# Patient Record
Sex: MALE | Race: WHITE | Employment: OTHER | RURAL
[De-identification: names, ages, dates, MRNs, and addresses within clinical notes are randomized per-mention and may not be internally consistent; named-entity substitution may affect disease eponyms.]

---

## 2017-04-24 ENCOUNTER — OFFICE VISIT (OUTPATIENT)
Dept: FAMILY MEDICINE CLINIC | Age: 72
End: 2017-04-24

## 2017-04-24 VITALS
OXYGEN SATURATION: 100 % | BODY MASS INDEX: 23.48 KG/M2 | SYSTOLIC BLOOD PRESSURE: 134 MMHG | TEMPERATURE: 97.5 F | WEIGHT: 164 LBS | RESPIRATION RATE: 18 BRPM | DIASTOLIC BLOOD PRESSURE: 73 MMHG | HEART RATE: 62 BPM | HEIGHT: 70 IN

## 2017-04-24 DIAGNOSIS — I10 ESSENTIAL HYPERTENSION: Primary | ICD-10-CM

## 2017-04-24 DIAGNOSIS — E78.00 HYPERCHOLESTEROLEMIA: ICD-10-CM

## 2017-04-24 DIAGNOSIS — N18.30 CKD (CHRONIC KIDNEY DISEASE) STAGE 3, GFR 30-59 ML/MIN (HCC): ICD-10-CM

## 2017-04-24 DIAGNOSIS — R79.89 ABNORMAL TSH: ICD-10-CM

## 2017-04-24 LAB
BILIRUB UR QL STRIP: NEGATIVE
GLUCOSE UR-MCNC: NEGATIVE MG/DL
KETONES P FAST UR STRIP-MCNC: NEGATIVE MG/DL
PH UR STRIP: 5.5 [PH] (ref 4.6–8)
PROT UR QL STRIP: NEGATIVE MG/DL
SP GR UR STRIP: 1.01 (ref 1–1.03)
UA UROBILINOGEN AMB POC: NORMAL (ref 0.2–1)
URINALYSIS CLARITY POC: CLEAR
URINALYSIS COLOR POC: YELLOW
URINE BLOOD POC: NEGATIVE
URINE LEUKOCYTES POC: NEGATIVE
URINE NITRITES POC: NEGATIVE

## 2017-04-24 RX ORDER — LISINOPRIL 40 MG/1
40 TABLET ORAL DAILY
Qty: 90 TAB | Refills: 1 | Status: CANCELLED | OUTPATIENT
Start: 2017-04-24

## 2017-04-24 NOTE — MR AVS SNAPSHOT
Visit Information Date & Time Provider Department Dept. Phone Encounter #  
 4/24/2017  3:00 PM Lalo Ge MD [facility] Primary Care [phone] [id number] Follow-up Instructions Return in about 3 months (around 7/24/2017). Upcoming Health Maintenance Date Due FOBT Q 1 YEAR AGE 50-75 2/16/1995 DTaP/Tdap/Td series (1 - Tdap) 5/1/2010 MEDICARE YEARLY EXAM 8/9/2017 GLAUCOMA SCREENING Q2Y 8/8/2018 Allergies as of 4/24/2017  Review Complete On: 4/24/2017 By: Lalo Ge MD  
  
 Severity Noted Reaction Type Reactions Pcn [Penicillins]  09/24/2013    Unknown (comments) Current Immunizations  Never Reviewed Name Date Influenza Vaccine 9/30/2016, 9/29/2014, 1/6/2013 Pneumococcal Conjugate (PCV-13) 1/2/2017 Pneumococcal Vaccine (Unspecified Type) 11/18/2010 Td 4/30/2010 Not reviewed this visit You Were Diagnosed With   
  
 Codes Comments Essential hypertension    -  Primary ICD-10-CM: I10 
ICD-9-CM: 401.9 CKD (chronic kidney disease) stage 3, GFR 30-59 ml/min     ICD-10-CM: N18.3 ICD-9-CM: 472. 3 Hypercholesterolemia     ICD-10-CM: E78.00 ICD-9-CM: 272.0 Abnormal TSH     ICD-10-CM: R79.89 ICD-9-CM: 790.6 Vitals BP Pulse Temp Resp Height(growth percentile) Weight(growth percentile) 134/73 (BP 1 Location: Left arm, BP Patient Position: Sitting) 62 97.5 °F (36.4 °C) (Oral) 18 5' 10\" (1.778 m) 164 lb (74.4 kg) SpO2 BMI Smoking Status 100% 23.53 kg/m2 Former Smoker Vitals History BMI and BSA Data Body Mass Index Body Surface Area  
 23.53 kg/m 2 1.92 m 2 Preferred Pharmacy Pharmacy Name Phone [facility], [address] AT [facility] [address] [phone] Your Updated Medication List  
  
   
This list is accurate as of: 4/24/17  3:50 PM.  Always use your most recent med list.  
  
  
  
 aspirin delayed-release 81 mg tablet Take 1 Tab by mouth daily. lisinopril 40 mg tablet Commonly known as:  Ace Pina Take 1 Tab by mouth daily. Indications: Hypertension  
  
 simvastatin 20 mg tablet Commonly known as:  ZOCOR Take 1 Tab by mouth nightly. We Performed the Following AMB POC URINALYSIS DIP STICK AUTO W/O MICRO [62494 CPT(R)] COLLECTION VENOUS BLOOD,VENIPUNCTURE [id number] CPT(R)] METABOLIC PANEL, COMPREHENSIVE [78458 CPT(R)] OCCULT BLOOD, IMMUNOASSAY (FIT) [id number] CPT(R)] TSH 3RD GENERATION [96198 CPT(R)] Follow-up Instructions Return in about 3 months (around 7/24/2017). Introducing [facility] & HEALTH SERVICES! Dear Rosalio Alcocer: Thank you for requesting a [employer] account. Our records indicate that you have previously registered for a [employer] account but its currently inactive. Please call our [employer] support line at [phone]. Additional Information If you have questions, please visit the Frequently Asked Questions section of the [employer] website at [email or web address] Remember, [employer] is NOT to be used for urgent needs. For medical emergencies, dial 911. Now available from your iPhone and Android! Please provide this summary of care documentation to your next provider. Your primary care clinician is listed as Jah Downs. If you have any questions after today's visit, please call [phone].

## 2017-04-24 NOTE — PROGRESS NOTES
HISTORY OF PRESENT ILLNESS  Calvin Russ III is a 67 y.o. male. Medication Refill   Pertinent negatives include no chest pain, no abdominal pain and no headaches. Hypertension  Blood pressures at home were increasing with the Lisinopril only. He tried going to 40mg of Lisinopril but still had elevated reading so for the past month he has been on Lisinoril 40mg with 1/4 of the Lisinopril 20/25 . Has been following a low sodium diet . Exercises daily and gets about 5974-8771 steps a day. Taking meds daily. Denies chest pain or shortness of breath. Still working in real estate. CKD  GFR in the 40s. He is on Lisinopril 40mg daily. We took him off HCTZ. Renal US normal in 2015. Avoids NSAIDs. He restarted the Lisinopril with HCTZ when his BP remained elevated and will take a 1/4 of a pill. Hyperlipidemia  Watching diet and taking Zocor. No myalgias. Exercising daily by walking. Borderline TSH  TSH was 5 in 2015 and again in Nov. Has not had any symptoms of hypothyroidism. Review of Systems   Constitutional: Negative for malaise/fatigue. HENT: Negative for congestion and sore throat. Respiratory: Negative for cough. Cardiovascular: Negative for chest pain and palpitations. Gastrointestinal: Negative for abdominal pain. Musculoskeletal: Negative for myalgias. Neurological: Negative for dizziness and headaches. Psychiatric/Behavioral: Negative for depression. Past Medical History:   Diagnosis Date    History of hepatitis A     Hypercholesterolemia     Hypertension     Polio     AGE 7    Renal insufficiency      Past Surgical History:   Procedure Laterality Date    HX CATARACT REMOVAL       Allergies   Allergen Reactions    Pcn [Penicillins] Unknown (comments)     Blood pressure 134/73, pulse 62, temperature 97.5 °F (36.4 °C), temperature source Oral, resp. rate 18, height 5' 10\" (1.778 m), weight 164 lb (74.4 kg), SpO2 100 %.     Physical Exam   Constitutional: He is oriented to person, place, and time. He appears well-developed and well-nourished. No distress. HENT:   Head: Normocephalic. Nose: Nose normal.   Mouth/Throat: Oropharynx is clear and moist.   Eyes: Conjunctivae are normal.   Neck: Neck supple. No carotid bruits   Cardiovascular: Normal rate, regular rhythm and normal heart sounds. No murmur heard. Pulmonary/Chest: Effort normal and breath sounds normal. No respiratory distress. Musculoskeletal: Normal range of motion. Lymphadenopathy:     He has no cervical adenopathy. Neurological: He is alert and oriented to person, place, and time. Skin: Skin is warm. Psychiatric: He has a normal mood and affect. Vitals reviewed. Results for orders placed or performed in visit on 18/68/28   METABOLIC PANEL, COMPREHENSIVE   Result Value Ref Range    Glucose 90 65 - 99 mg/dL    BUN 30 (H) 8 - 27 mg/dL    Creatinine 1.29 (H) 0.76 - 1.27 mg/dL    GFR est non-AA 55 (L) >59 mL/min/1.73    GFR est AA 64 >59 mL/min/1.73    BUN/Creatinine ratio 23 (H) 10 - 22    Sodium 141 136 - 144 mmol/L    Potassium 4.1 3.5 - 5.2 mmol/L    Chloride 100 97 - 106 mmol/L    CO2 29 18 - 29 mmol/L    Calcium 9.0 8.6 - 10.2 mg/dL    Protein, total 5.7 (L) 6.0 - 8.5 g/dL    Albumin 3.9 3.5 - 4.8 g/dL    GLOBULIN, TOTAL 1.8 1.5 - 4.5 g/dL    A-G Ratio 2.2 1.1 - 2.5    Bilirubin, total 0.5 0.0 - 1.2 mg/dL    Alk. phosphatase 47 39 - 117 IU/L    AST (SGOT) 19 0 - 40 IU/L    ALT (SGPT) 12 0 - 44 IU/L   CBC WITH AUTOMATED DIFF   Result Value Ref Range    WBC 5.8 3.4 - 10.8 x10E3/uL    RBC 4.48 4.14 - 5.80 x10E6/uL    HGB 13.3 12.6 - 17.7 g/dL    HCT 38.9 37.5 - 51.0 %    MCV 87 79 - 97 fL    MCH 29.7 26.6 - 33.0 pg    MCHC 34.2 31.5 - 35.7 g/dL    RDW 13.7 12.3 - 15.4 %    PLATELET 234 195 - 477 x10E3/uL    NEUTROPHILS 54 %    Lymphocytes 31 %    MONOCYTES 10 %    EOSINOPHILS 4 %    BASOPHILS 1 %    ABS.  NEUTROPHILS 3.1 1.4 - 7.0 x10E3/uL    Abs Lymphocytes 1.8 0.7 - 3.1 x10E3/uL ABS. MONOCYTES 0.6 0.1 - 0.9 x10E3/uL    ABS. EOSINOPHILS 0.2 0.0 - 0.4 x10E3/uL    ABS. BASOPHILS 0.1 0.0 - 0.2 x10E3/uL    IMMATURE GRANULOCYTES 0 %    ABS. IMM.  GRANS. 0.0 0.0 - 0.1 x10E3/uL   LIPID PANEL   Result Value Ref Range    Cholesterol, total 164 100 - 199 mg/dL    Triglyceride 105 0 - 149 mg/dL    HDL Cholesterol 40 >39 mg/dL    VLDL, calculated 21 5 - 40 mg/dL    LDL, calculated 103 (H) 0 - 99 mg/dL   THYROID PANEL W/TSH   Result Value Ref Range    TSH 5.870 (H) 0.450 - 4.500 uIU/mL    T4, Total 7.8 4.5 - 12.0 ug/dL    T3 Uptake 30 24 - 39 %    Free Thyroxine Index 2.3 1.2 - 4.9   CKD REPORT   Result Value Ref Range    Interpretation Note    CVD REPORT   Result Value Ref Range    INTERPRETATION NTAP        ASSESSMENT and PLAN  HTN   Check CMP   Avoid sodium   Continue exercise   RTO 3-6 months  CKD   Check CMP   Avoid NSAIDs  Hyperlipidemia   Continue low fat diet  Borderline thyroid function test   Check TSH again and if higher will treat

## 2017-04-25 LAB
ALBUMIN SERPL-MCNC: 3.9 G/DL (ref 3.5–4.8)
ALBUMIN/GLOB SERPL: 1.9 {RATIO} (ref 1.2–2.2)
ALP SERPL-CCNC: 51 IU/L (ref 39–117)
ALT SERPL-CCNC: 9 IU/L (ref 0–44)
AST SERPL-CCNC: 17 IU/L (ref 0–40)
BILIRUB SERPL-MCNC: <0.2 MG/DL (ref 0–1.2)
BUN SERPL-MCNC: 25 MG/DL (ref 8–27)
BUN/CREAT SERPL: 22 (ref 10–24)
CALCIUM SERPL-MCNC: 8.9 MG/DL (ref 8.6–10.2)
CHLORIDE SERPL-SCNC: 100 MMOL/L (ref 96–106)
CO2 SERPL-SCNC: 27 MMOL/L (ref 18–29)
CREAT SERPL-MCNC: 1.16 MG/DL (ref 0.76–1.27)
GLOBULIN SER CALC-MCNC: 2.1 G/DL (ref 1.5–4.5)
GLUCOSE SERPL-MCNC: 91 MG/DL (ref 65–99)
INTERPRETATION: NORMAL
POTASSIUM SERPL-SCNC: 4.2 MMOL/L (ref 3.5–5.2)
PROT SERPL-MCNC: 6 G/DL (ref 6–8.5)
SODIUM SERPL-SCNC: 141 MMOL/L (ref 134–144)
TSH SERPL DL<=0.005 MIU/L-ACNC: 5.01 UIU/ML (ref 0.45–4.5)

## 2017-04-26 RX ORDER — VALSARTAN 160 MG/1
160 TABLET ORAL DAILY
Qty: 90 TAB | Refills: 1 | Status: SHIPPED | OUTPATIENT
Start: 2017-04-26 | End: 2017-09-12

## 2017-04-27 NOTE — PROGRESS NOTES
Kidney function has improved quite a bit by stopping the HCTZ. Would change the Lisinopril to Diovan 160mg daily. New Rx sent to [facility]. Monitor BP several times a week and call with numbers, TSH is still borderline. Since he is not having symptoms would recheck in 6  Months.

## 2017-05-05 ENCOUNTER — TELEPHONE (OUTPATIENT)
Dept: FAMILY MEDICINE CLINIC | Age: 72
End: 2017-05-05

## 2017-05-05 NOTE — TELEPHONE ENCOUNTER
Blood pressure readings:     04/29 142/85  04/30 140/75  05/01 148/89  05/02 154/94  05/03 152/73  05/04 149/56  05/05 150/82

## 2017-05-07 NOTE — TELEPHONE ENCOUNTER
Have him take the Lisinopril 40mg daily and add Norvasc 2.5mg daily. Please call in #30 of Norvasc.  Have him monitor his BP and send in results of his BP in 2 weeks

## 2017-05-08 RX ORDER — LISINOPRIL 40 MG/1
40 TABLET ORAL DAILY
Qty: 30 TAB | Refills: 0 | Status: SHIPPED | OUTPATIENT
Start: 2017-05-08 | End: 2017-08-01

## 2017-05-08 RX ORDER — AMLODIPINE BESYLATE 2.5 MG/1
2.5 TABLET ORAL DAILY
Qty: 30 TAB | Refills: 0 | Status: SHIPPED | OUTPATIENT
Start: 2017-05-08 | End: 2017-05-08 | Stop reason: SDUPTHER

## 2017-05-08 RX ORDER — AMLODIPINE BESYLATE 2.5 MG/1
TABLET ORAL
Qty: 90 TAB | Refills: 0 | Status: SHIPPED | OUTPATIENT
Start: 2017-05-08 | End: 2017-06-20 | Stop reason: SDUPTHER

## 2017-06-20 ENCOUNTER — DOCUMENTATION ONLY (OUTPATIENT)
Dept: FAMILY MEDICINE CLINIC | Age: 72
End: 2017-06-20

## 2017-06-20 RX ORDER — AMLODIPINE BESYLATE 5 MG/1
5 TABLET ORAL DAILY
Qty: 90 TAB | Refills: 1 | Status: SHIPPED | OUTPATIENT
Start: 2017-06-20 | End: 2017-09-12 | Stop reason: SDUPTHER

## 2017-08-01 ENCOUNTER — OFFICE VISIT (OUTPATIENT)
Dept: FAMILY MEDICINE CLINIC | Age: 72
End: 2017-08-01

## 2017-08-01 VITALS
SYSTOLIC BLOOD PRESSURE: 133 MMHG | DIASTOLIC BLOOD PRESSURE: 75 MMHG | HEART RATE: 62 BPM | BODY MASS INDEX: 23.07 KG/M2 | OXYGEN SATURATION: 99 % | HEIGHT: 70 IN | WEIGHT: 161.13 LBS | RESPIRATION RATE: 18 BRPM

## 2017-08-01 DIAGNOSIS — E78.00 HYPERCHOLESTEROLEMIA: ICD-10-CM

## 2017-08-01 DIAGNOSIS — R79.89 ABNORMAL TSH: Primary | ICD-10-CM

## 2017-08-01 DIAGNOSIS — I10 ESSENTIAL HYPERTENSION: ICD-10-CM

## 2017-08-01 DIAGNOSIS — Z13.39 SCREENING FOR ALCOHOLISM: ICD-10-CM

## 2017-08-01 DIAGNOSIS — Z12.11 ENCOUNTER FOR HEMOCCULT SCREENING: ICD-10-CM

## 2017-08-01 DIAGNOSIS — Z71.89 ADVANCE CARE PLANNING: ICD-10-CM

## 2017-08-01 DIAGNOSIS — Z00.00 ROUTINE GENERAL MEDICAL EXAMINATION AT A HEALTH CARE FACILITY: ICD-10-CM

## 2017-08-01 DIAGNOSIS — N40.1 BPH ASSOCIATED WITH NOCTURIA: ICD-10-CM

## 2017-08-01 DIAGNOSIS — N18.30 CKD (CHRONIC KIDNEY DISEASE) STAGE 3, GFR 30-59 ML/MIN (HCC): ICD-10-CM

## 2017-08-01 DIAGNOSIS — R35.1 BPH ASSOCIATED WITH NOCTURIA: ICD-10-CM

## 2017-08-01 PROBLEM — N18.2 CKD (CHRONIC KIDNEY DISEASE) STAGE 2, GFR 60-89 ML/MIN: Status: ACTIVE | Noted: 2017-08-01

## 2017-08-01 NOTE — MR AVS SNAPSHOT
Visit Information Date & Time Provider Department Dept. Phone Encounter #  
 8/1/2017  7:40 AM Patrick Najera MD [place] [address] Primary Care [phone] [id number] Follow-up Instructions Return in about 6 months (around 2/1/2018). Upcoming Health Maintenance Date Due FOBT Q 1 YEAR AGE 50-75 2/16/1995 INFLUENZA AGE 9 TO ADULT 8/1/2017 MEDICARE YEARLY EXAM 8/2/2018 GLAUCOMA SCREENING Q2Y 8/8/2018 DTaP/Tdap/Td series (2 - Td) 8/1/2027 Allergies as of 8/1/2017  Review Complete On: 8/1/2017 By: Patrick Najera MD  
  
 Severity Noted Reaction Type Reactions Pcn [Penicillins]  09/24/2013    Unknown (comments) Current Immunizations  Never Reviewed Name Date Influenza Vaccine 9/30/2016, 9/29/2014, 1/6/2013 Pneumococcal Conjugate (PCV-13) 1/2/2017 Pneumococcal Vaccine (Unspecified Type) 11/18/2010 Td 4/30/2010 Not reviewed this visit You Were Diagnosed With   
  
 Codes Comments Abnormal TSH    -  Primary ICD-10-CM: R79.89 ICD-9-CM: 790.6 Routine general medical examination at a health care facility     ICD-10-CM: Z00.00 ICD-9-CM: V70.0 Screening for alcoholism     ICD-10-CM: Z13.89 ICD-9-CM: V79.1 Essential hypertension     ICD-10-CM: I10 
ICD-9-CM: 401.9 CKD (chronic kidney disease) stage 3, GFR 30-59 ml/min     ICD-10-CM: N18.3 ICD-9-CM: 576. 3 Hypercholesterolemia     ICD-10-CM: E78.00 ICD-9-CM: 272.0 BPH associated with nocturia     ICD-10-CM: N40.1, R35.1 ICD-9-CM: 600.01, 788.43 Encounter for Hemoccult screening     ICD-10-CM: Z12.11 ICD-9-CM: V76.51 Vitals BP Pulse Resp Height(growth percentile) Weight(growth percentile) SpO2  
 133/75 (BP 1 Location: Left arm) 62 18 5' 10\" (1.778 m) 161 lb 2 oz (73.1 kg) 99% BMI Smoking Status 23.12 kg/m2 Former Smoker Vitals History BMI and BSA Data Body Mass Index Body Surface Area 23.12 kg/m 2 1.9 m 2 Preferred Pharmacy Pharmacy Name Phone [facility], [address] [facility] AT [facility] [address] [facility] [phone] Your Updated Medication List  
  
   
This list is accurate as of: 8/1/17  8:35 AM.  Always use your most recent med list. amLODIPine 5 mg tablet Commonly known as:  Liz Lily Take 1 Tab by mouth daily. aspirin delayed-release 81 mg tablet Take 1 Tab by mouth daily. simvastatin 20 mg tablet Commonly known as:  ZOCOR Take 1 Tab by mouth nightly. valsartan 160 mg tablet Commonly known as:  DIOVAN Take 1 Tab by mouth daily. We Performed the Following CBC WITH AUTOMATED DIFF [48159 CPT(R)] COLLECTION VENOUS BLOOD,VENIPUNCTURE [id number] CPT(R)] LIPID PANEL [41042 CPT(R)] METABOLIC PANEL, COMPREHENSIVE [02209 CPT(R)] OCCULT BLOOD, IMMUNOASSAY (FIT) [id number] CPT(R)] PSA W/ REFLX FREE PSA [54099 CPT(R)] TSH 3RD GENERATION [56112 CPT(R)] Follow-up Instructions Return in about 6 months (around 2/1/2018). Introducing [facility] & HEALTH SERVICES! Dear Anabella Gerardo: Thank you for requesting a [employer] account. Our records indicate that you already have an active [employer] account. You can access your account anytime at [email or web address] Did you know that you can access your hospital and ER discharge instructions at any time in [employer]? You can also review all of your test results from your hospital stay or ER visit. Additional Information If you have questions, please visit the Frequently Asked Questions section of the [employer] website at [email or web address] Remember, [employer] is NOT to be used for urgent needs. For medical emergencies, dial 911. Now available from your iPhone and Android! Please provide this summary of care documentation to your next provider. Your primary care clinician is listed as Ellyn Mayorga. If you have any questions after today's visit, please call [phone].

## 2017-08-01 NOTE — PROGRESS NOTES
This is a Subsequent Medicare Annual Wellness Visit providing Personalized Prevention Plan Services (PPPS) (Performed 12 months after initial AWV and PPPS )    I have reviewed the patient's medical history in detail and updated the computerized patient record. History   Hypertension  Blood pressures at home have been 394-944 systolic and 20-83 diastolic. Has been following a low sodium diet . Exercises several  times per week. Taking meds daily. Denies chest pain or shortness of breath. Has been started on Diovan and is on Amlodipine. Off HCTZ due to elevated calcium. Thyroid  TSH has been borderline high at 5. Hyperlipidemia  On Zocor- no myalgias    BPH  Nocturia x3 each night. Able to get back to sleep. Takes a nap in the afternoon to help catch up. Past Medical History:   Diagnosis Date    History of hepatitis A     Hypercholesterolemia     Hypertension     Polio     AGE 7    Renal insufficiency       Past Surgical History:   Procedure Laterality Date    HX CATARACT REMOVAL       Current Outpatient Prescriptions   Medication Sig Dispense Refill    amLODIPine (NORVASC) 5 mg tablet Take 1 Tab by mouth daily. 90 Tab 1    valsartan (DIOVAN) 160 mg tablet Take 1 Tab by mouth daily. 90 Tab 1    simvastatin (ZOCOR) 20 mg tablet Take 1 Tab by mouth nightly. 90 Tab 3    aspirin delayed-release 81 mg tablet Take 1 Tab by mouth daily. 90 Tab 3    lisinopril (PRINIVIL, ZESTRIL) 40 mg tablet Take 1 Tab by mouth daily.  30 Tab 0     Allergies   Allergen Reactions    Pcn [Penicillins] Unknown (comments)     Family History   Problem Relation Age of Onset    Hypertension Father     Cancer Father      woodsman cancer    Heart Disease Maternal Grandfather     Dementia Mother     Macular Degen Mother     Cancer Sister      breast     Social History   Substance Use Topics    Smoking status: Former Smoker    Smokeless tobacco: Never Used    Alcohol use 0.0 oz/week     0 Standard drinks or equivalent per week     Patient Active Problem List   Diagnosis Code    Hypercholesterolemia E78.00    CKD (chronic kidney disease) stage 3, GFR 30-59 ml/min N18.3    Essential hypertension I10    Abnormal TSH R79.89       Depression Risk Factor Screening:     PHQ over the last two weeks 8/1/2017   PHQ Not Done Patient Decline   Little interest or pleasure in doing things -   Feeling down, depressed or hopeless -   Total Score PHQ 2 -     Alcohol Risk Factor Screening: On any occasion during the past 3 months, have you had more than 4 drinks containing alcohol? Yes    Do you average more than 14 drinks per week? No        Functional Ability and Level of Safety:     Functional Ability:   Does the patient exhibit a steady gait? yesyes   How long did it take the patient to get up and walk from a sitting position? 3 sec   Is the patient self reliant?  (ie can do own laundry, meals, household chores)  yes     Does the patient handle his/her own medications? yes     Does the patient handle his/her own money? yes     Is the patients home safe (ie good lighting, handrails on stairs and bath, etc.)? yes     Did you notice or did patient express any hearing difficulties? no     Did you notice or did patient express any vision difficulties?   no     Were distance and reading eye charts used? no       Advance Care Planning:   Patient was offered the opportunity to discuss advance care planning:  yes     Does patient have an Advance Directive:  yes   If no, did you provide information on Caring Connections?   no     ADL Assessment 8/1/2017   Feeding yourself No Help Needed   Getting from bed to chair No Help Needed   Getting dressed No Help Needed   Bathing or showering No Help Needed   Walk across the room (includes cane/walker) No Help Needed   Using the telphone No Help Needed   Taking your medications No Help Needed   Preparing meals No Help Needed   Managing money (expenses/bills) No Help Needed Moderately strenuous housework (laundry) No Help Needed   Shopping for personal items (toiletries/medicines) No Help Needed   Shopping for groceries No Help Needed   Driving No Help Needed   Climbing a flight of stairs No Help Needed   Getting to places beyond walking distances No Help Needed         Hearing Loss   normal-to-mild    Activities of Daily Living   Self-care. Requires assistance with: no ADLs    Fall Risk   Fall Risk Assessment, last 12 mths 8/1/2017   Able to walk? Yes   Fall in past 12 months? Yes   Fall with injury? No   Number of falls in past 12 months 3   Fall Risk Score 3     Abuse Screen   Patient is not abused    Review of Systems   A comprehensive review of systems was negative except for that written in the HPI. Physical Examination     Evaluation of Cognitive Function:  Mood/affect:  neutral  Appearance: age appropriate  Family member/caregiver input: n/a  Clock test done and passed    Visit Vitals    /75 (BP 1 Location: Left arm)    Pulse 62    Resp 18    Ht 5' 10\" (1.778 m)    Wt 161 lb 2 oz (73.1 kg)    SpO2 99%    BMI 23.12 kg/m2     General:  Alert, cooperative, no distress, appears stated age. Head:  Normocephalic, without obvious abnormality, atraumatic. Eyes:  Conjunctivae/corneas clear. PERRL, EOMs intact. Ears:  Normal TMs and external ear canals both ears. Nose: Nares normal. Septum midline. Mucosa normal. No drainage or sinus tenderness. Throat: Lips, mucosa, and tongue normal. Teeth and gums normal.   Neck: Supple, symmetrical, trachea midline, no adenopathy, thyroid: no enlargement/tenderness/nodules, no carotid bruit and no JVD. Back:   Symmetric, no curvature. ROM normal. No CVA tenderness. Lungs:   Clear to auscultation bilaterally. Chest wall:  No tenderness or deformity. Heart:  Regular rate and rhythm, S1, S2 normal, no murmur, click, rub or gallop. Abdomen:   Soft, non-tender. Bowel sounds normal. No masses,  No organomegaly. Genitalia:  Normal male without lesion, discharge or tenderness. Rectal:  Normal tone, enlarged prostate, no masses or tenderness   Extremities: Extremities normal, atraumatic, no cyanosis or edema. Pulses: 2+ and symmetric all extremities. Skin: Skin color, texture, turgor normal. No rashes or lesions   Lymph nodes: Cervical, supraclavicular, and axillary nodes normal.   Neurologic: CNII-XII intact. Normal strength, sensation and reflexes throughout. Patient Care Team:  Henry Gomez MD as PCP - [facility])    Advice/Referrals/Counseling   Education and counseling provided:  End-of-Life planning (with patient's consent)  Colorectal cancer screening tests      Assessment/Plan       ICD-10-CM ICD-9-CM    1. Abnormal TSH R79.89 790.6 TSH 3RD GENERATION      COLLECTION VENOUS BLOOD,VENIPUNCTURE   2. Routine general medical examination at a health care facility Z00.00 V70.0 PSA W/ REFLX FREE PSA      COLLECTION VENOUS BLOOD,VENIPUNCTURE      OCCULT BLOOD, IMMUNOASSAY (FIT)   3. Screening for alcoholism Z13.89 V79.1 COLLECTION VENOUS BLOOD,VENIPUNCTURE   4. Essential hypertension M81 317.6 METABOLIC PANEL, COMPREHENSIVE      LIPID PANEL      CBC WITH AUTOMATED DIFF      COLLECTION VENOUS BLOOD,VENIPUNCTURE   5. CKD (chronic kidney disease) stage 3, GFR 30-59 ml/min I88.6 052.9 METABOLIC PANEL, COMPREHENSIVE      LIPID PANEL      CBC WITH AUTOMATED DIFF      COLLECTION VENOUS BLOOD,VENIPUNCTURE   6. Hypercholesterolemia E46.59 897.0 METABOLIC PANEL, COMPREHENSIVE      LIPID PANEL      CBC WITH AUTOMATED DIFF      COLLECTION VENOUS BLOOD,VENIPUNCTURE   7. BPH associated with nocturia N40.1 600.01 COLLECTION VENOUS BLOOD,VENIPUNCTURE    R35.1 788.43    8.  Encounter for Hemoccult screening Z12.11 V76.51 OCCULT BLOOD, IMMUNOASSAY (FIT)     current treatment plan is effective, no change in therapy  lab results and schedule of future lab studies reviewed with patient  reviewed medications and side effects in detail. Pt given FIT test     Advance Care Planning (ACP) Provider Note - Comprehensive     Date of ACP Conversation: 08/01/17  Persons included in Conversation:  patient  Length of ACP Conversation in minutes:  <16 minutes (Non-Billable)    Authorized Decision Maker (if patient is incapable of making informed decisions): This person is: Other Legally Authorized Decision Maker (e.g. Next of Kin)          General ACP for ALL Patients with Decision Making Capacity:   Importance of advance care planning, including choosing a healthcare agent to communicate patient's healthcare decisions if patient lost the ability to make decisions, such as after a sudden illness or accident  Understanding of the healthcare agent role was assessed and information provided    Review of Existing Advance Directive:  Does this advance directive still reflect your preferences?   Yes (Provide new form/Refer for assistance in updating)    Interventions Provided:  Recommended communicating the plan and making copies for the healthcare agent, personal physician, and others as appropriate (e.g., health system)  Recommended review of completed ACP document annually or upon change in health status

## 2017-08-01 NOTE — ACP (ADVANCE CARE PLANNING)
Advance Care Planning 8/1/2017   Confirm Advance Directive Yes, not on file     Pt will bring copy to us

## 2017-08-02 LAB
ALBUMIN SERPL-MCNC: 4.1 G/DL (ref 3.5–4.8)
ALBUMIN/GLOB SERPL: 1.9 {RATIO} (ref 1.2–2.2)
ALP SERPL-CCNC: 56 IU/L (ref 39–117)
ALT SERPL-CCNC: 14 IU/L (ref 0–44)
AST SERPL-CCNC: 21 IU/L (ref 0–40)
BASOPHILS # BLD AUTO: 0 X10E3/UL (ref 0–0.2)
BASOPHILS NFR BLD AUTO: 1 %
BILIRUB SERPL-MCNC: 0.4 MG/DL (ref 0–1.2)
BUN SERPL-MCNC: 33 MG/DL (ref 8–27)
BUN/CREAT SERPL: 18 (ref 10–24)
CALCIUM SERPL-MCNC: 9 MG/DL (ref 8.6–10.2)
CHLORIDE SERPL-SCNC: 99 MMOL/L (ref 96–106)
CHOLEST SERPL-MCNC: 159 MG/DL (ref 100–199)
CO2 SERPL-SCNC: 29 MMOL/L (ref 18–29)
CREAT SERPL-MCNC: 1.82 MG/DL (ref 0.76–1.27)
EOSINOPHIL # BLD AUTO: 0.3 X10E3/UL (ref 0–0.4)
EOSINOPHIL NFR BLD AUTO: 4 %
ERYTHROCYTE [DISTWIDTH] IN BLOOD BY AUTOMATED COUNT: 13.8 % (ref 12.3–15.4)
GLOBULIN SER CALC-MCNC: 2.2 G/DL (ref 1.5–4.5)
GLUCOSE SERPL-MCNC: 91 MG/DL (ref 65–99)
HCT VFR BLD AUTO: 39.2 % (ref 37.5–51)
HDLC SERPL-MCNC: 40 MG/DL
HGB BLD-MCNC: 13.1 G/DL (ref 12.6–17.7)
IMM GRANULOCYTES # BLD: 0 X10E3/UL (ref 0–0.1)
IMM GRANULOCYTES NFR BLD: 0 %
INTERPRETATION, 910389: NORMAL
INTERPRETATION: NORMAL
LDLC SERPL CALC-MCNC: 94 MG/DL (ref 0–99)
LYMPHOCYTES # BLD AUTO: 1.8 X10E3/UL (ref 0.7–3.1)
LYMPHOCYTES NFR BLD AUTO: 27 %
MCH RBC QN AUTO: 30 PG (ref 26.6–33)
MCHC RBC AUTO-ENTMCNC: 33.4 G/DL (ref 31.5–35.7)
MCV RBC AUTO: 90 FL (ref 79–97)
MONOCYTES # BLD AUTO: 0.6 X10E3/UL (ref 0.1–0.9)
MONOCYTES NFR BLD AUTO: 9 %
NEUTROPHILS # BLD AUTO: 4 X10E3/UL (ref 1.4–7)
NEUTROPHILS NFR BLD AUTO: 59 %
PDF IMAGE, 910387: NORMAL
PLATELET # BLD AUTO: 215 X10E3/UL (ref 150–379)
POTASSIUM SERPL-SCNC: 4.4 MMOL/L (ref 3.5–5.2)
PROT SERPL-MCNC: 6.3 G/DL (ref 6–8.5)
PSA SERPL-MCNC: 1.6 NG/ML (ref 0–4)
RBC # BLD AUTO: 4.37 X10E6/UL (ref 4.14–5.8)
REFLEX CRITERIA: NORMAL
SODIUM SERPL-SCNC: 142 MMOL/L (ref 134–144)
TRIGL SERPL-MCNC: 125 MG/DL (ref 0–149)
TSH SERPL DL<=0.005 MIU/L-ACNC: 6.72 UIU/ML (ref 0.45–4.5)
VLDLC SERPL CALC-MCNC: 25 MG/DL (ref 5–40)
WBC # BLD AUTO: 6.8 X10E3/UL (ref 3.4–10.8)

## 2017-08-03 RX ORDER — LEVOTHYROXINE SODIUM 50 UG/1
50 TABLET ORAL
Qty: 30 TAB | Refills: 2 | Status: SHIPPED | OUTPATIENT
Start: 2017-08-03 | End: 2017-08-03 | Stop reason: SDUPTHER

## 2017-08-03 RX ORDER — LEVOTHYROXINE SODIUM 50 UG/1
TABLET ORAL
Qty: 90 TAB | Refills: 2 | Status: SHIPPED | OUTPATIENT
Start: 2017-08-03 | End: 2018-04-17 | Stop reason: SDUPTHER

## 2017-08-03 NOTE — PROGRESS NOTES
CBC is normal. Chem panel shows that his kidney function is reduced. Which is worrisome. Stop the Diovan and monitor BP. Recheck chem panel and RTO in 4 weeks. Avoid all NSAIDs such as advil, aleve, naproxen, and ibuprofen. Lipids look great. TSH is high meanng he is hypothyroid. Call in Synthroid 50mcg daily.  PSA normal.

## 2017-08-03 NOTE — PROGRESS NOTES
Patient aware and states understanding. Rx for synthroid sent to pharmacy at [facility]. Patient will call back later today to schedule appt.

## 2017-08-04 LAB — HEMOCCULT STL QL IA: NEGATIVE

## 2017-09-08 ENCOUNTER — LAB ONLY (OUTPATIENT)
Dept: FAMILY MEDICINE CLINIC | Age: 72
End: 2017-09-08

## 2017-09-08 DIAGNOSIS — R94.4 KIDNEY FUNCTION TEST ABNORMAL: Primary | ICD-10-CM

## 2017-09-09 LAB
ALBUMIN SERPL-MCNC: 3.8 G/DL (ref 3.5–4.8)
ALBUMIN/GLOB SERPL: 1.7 {RATIO} (ref 1.2–2.2)
ALP SERPL-CCNC: 61 IU/L (ref 39–117)
ALT SERPL-CCNC: 12 IU/L (ref 0–44)
AST SERPL-CCNC: 20 IU/L (ref 0–40)
BILIRUB SERPL-MCNC: 0.4 MG/DL (ref 0–1.2)
BUN SERPL-MCNC: 28 MG/DL (ref 8–27)
BUN/CREAT SERPL: 16 (ref 10–24)
CALCIUM SERPL-MCNC: 8.3 MG/DL (ref 8.6–10.2)
CHLORIDE SERPL-SCNC: 100 MMOL/L (ref 96–106)
CO2 SERPL-SCNC: 30 MMOL/L (ref 18–29)
CREAT SERPL-MCNC: 1.77 MG/DL (ref 0.76–1.27)
GLOBULIN SER CALC-MCNC: 2.2 G/DL (ref 1.5–4.5)
GLUCOSE SERPL-MCNC: 137 MG/DL (ref 65–99)
INTERPRETATION: NORMAL
POTASSIUM SERPL-SCNC: 4.3 MMOL/L (ref 3.5–5.2)
PROT SERPL-MCNC: 6 G/DL (ref 6–8.5)
SODIUM SERPL-SCNC: 142 MMOL/L (ref 134–144)

## 2017-09-12 ENCOUNTER — OFFICE VISIT (OUTPATIENT)
Dept: FAMILY MEDICINE CLINIC | Age: 72
End: 2017-09-12

## 2017-09-12 VITALS
HEART RATE: 66 BPM | WEIGHT: 162.5 LBS | BODY MASS INDEX: 23.26 KG/M2 | RESPIRATION RATE: 18 BRPM | TEMPERATURE: 97 F | OXYGEN SATURATION: 98 % | SYSTOLIC BLOOD PRESSURE: 157 MMHG | HEIGHT: 70 IN | DIASTOLIC BLOOD PRESSURE: 87 MMHG

## 2017-09-12 DIAGNOSIS — I10 ESSENTIAL HYPERTENSION: ICD-10-CM

## 2017-09-12 DIAGNOSIS — E03.4 HYPOTHYROIDISM DUE TO ACQUIRED ATROPHY OF THYROID: ICD-10-CM

## 2017-09-12 DIAGNOSIS — N18.2 CKD (CHRONIC KIDNEY DISEASE) STAGE 2, GFR 60-89 ML/MIN: Primary | ICD-10-CM

## 2017-09-12 RX ORDER — AMLODIPINE BESYLATE 10 MG/1
10 TABLET ORAL DAILY
Qty: 90 TAB | Refills: 1 | Status: SHIPPED | OUTPATIENT
Start: 2017-09-12 | End: 2018-03-12 | Stop reason: SDUPTHER

## 2017-09-12 NOTE — MR AVS SNAPSHOT
Visit Information Date & Time Provider Department Dept. Phone Encounter #  
 9/12/2017  5:20 PM Shaila Mota MD [facility] BEHAVIORAL MEDICINE Primary Care [phone] [id number] Follow-up Instructions Return in about 3 months (around 12/12/2017). Your Appointments 1/30/2018  8:40 AM  
Follow Up with Shaila Mota MD  
[place] FOR BEHAVIORAL MEDICINE Primary Care [facility] Appt Note: 6 mo f/u  
 [address] [facility] [address] [id number] [phone]  
  
   
 [facility] [address] [address] Upcoming Health Maintenance Date Due FOBT Q 1 YEAR AGE 50-75 8/1/2018 MEDICARE YEARLY EXAM 8/2/2018 GLAUCOMA SCREENING Q2Y 8/8/2018 DTaP/Tdap/Td series (2 - Td) 8/1/2027 Allergies as of 9/12/2017  Review Complete On: 9/12/2017 By: Shaila Mota MD  
  
 Severity Noted Reaction Type Reactions Pcn [Penicillins]  09/24/2013    Unknown (comments) Current Immunizations  Never Reviewed Name Date Influenza Vaccine 9/30/2016, 9/29/2014, 1/6/2013 Pneumococcal Conjugate (PCV-13) 1/2/2017 Pneumococcal Vaccine (Unspecified Type) 11/18/2010 Td 4/30/2010 Not reviewed this visit You Were Diagnosed With   
  
 Codes Comments CKD (chronic kidney disease) stage 2, GFR 60-89 ml/min    -  Primary ICD-10-CM: N18.2 ICD-9-CM: 689. 2 Hypothyroidism due to acquired atrophy of thyroid     ICD-10-CM: E03.4 ICD-9-CM: 244.8, 246.8 Essential hypertension     ICD-10-CM: I10 
ICD-9-CM: 401.9 Vitals BP Pulse Temp Resp Height(growth percentile) Weight(growth percentile) 157/87 (BP 1 Location: Left arm) 66 97 °F (36.1 °C) (Oral) 18 5' 10\" (1.778 m) 162 lb 8 oz (73.7 kg) SpO2 BMI Smoking Status 98% 23.32 kg/m2 Former Smoker Vitals History BMI and BSA Data Body Mass Index Body Surface Area  
 23.32 kg/m 2 1.91 m 2 Preferred Pharmacy Pharmacy Name Phone [place] , [address] [facility] AT [facility] [address] [phone] Your Updated Medication List  
  
   
This list is accurate as of: 9/12/17  6:00 PM.  Always use your most recent med list. amLODIPine 10 mg tablet Commonly known as:  Andrew Robert Take 1 Tab by mouth daily. aspirin delayed-release 81 mg tablet Take 1 Tab by mouth daily. levothyroxine 50 mcg tablet Commonly known as:  SYNTHROID  
TAKE 1 TABLET BY MOUTH DAILY BEFORE BREAKFAST  
  
 simvastatin 20 mg tablet Commonly known as:  ZOCOR Take 1 Tab by mouth nightly. Prescriptions Sent to Pharmacy Refills  
 amLODIPine (NORVASC) 10 mg tablet 1 Sig: Take 1 Tab by mouth daily. Class: Normal  
 Pharmacy: [facility] [place] [id number], [address] [place] [address] [address] Ph #: [phone] Route: Oral  
  
Follow-up Instructions Return in about 3 months (around 12/12/2017). Introducing [facility] & HEALTH SERVICES! Dear Mayela Michael: Thank you for requesting a Rally Fit account. Our records indicate that you already have an active Rally Fit account. You can access your account anytime at [email or web address] Did you know that you can access your hospital and ER discharge instructions at any time in Rally Fit? You can also review all of your test results from your hospital stay or ER visit. Additional Information If you have questions, please visit the Frequently Asked Questions section of the Rally Fit website at [email or web address] Remember, Rally Fit is NOT to be used for urgent needs. For medical emergencies, dial 911. Now available from your iPhone and Android! Please provide this summary of care documentation to your next provider. Your primary care clinician is listed as Rafa Ramires.  If you have any questions after today's visit, please call [phone].

## 2017-09-12 NOTE — PROGRESS NOTES
HISTORY OF PRESENT ILLNESS  Lidia Noyola III is a 67 y.o. male. HPI  Hypertension  Blood pressures at home have been 193-016 systolic and 24-81 diastolic. Has been following a low sodium diet . Exercises quite a bit. Taking meds daily. Denies chest pain or shortness of breath. Hypothyroidism  Recently placed on Synthroid. More fatigued. CKD  Las Creatinine was 1.7. He has stopped NSAIDs. We took him off Diovan. Urine checked in April and there was no protein. His creatinine has ranges from 1 to 1.8 over the past few years. Prior to placing him on Diovan though it was 1.1 and went up to 1.8 in a few weeks thus making the decision to stop the medication. Urine has been negative for protein. Review of Systems   Constitutional: Positive for malaise/fatigue. Negative for fever. HENT: Negative for congestion and sore throat. Respiratory: Negative for cough. Cardiovascular: Negative for chest pain and palpitations. Gastrointestinal: Positive for heartburn. Negative for abdominal pain. Genitourinary: Negative for dysuria, flank pain, frequency, hematuria and urgency. Musculoskeletal: Negative for myalgias. Neurological: Negative for dizziness and headaches. Psychiatric/Behavioral: Negative for depression. Past Medical History:   Diagnosis Date    CKD (chronic kidney disease) stage 2, GFR 60-89 ml/min 8/1/2017    History of hepatitis A     Hypercholesterolemia     Hypertension     Polio     AGE 7     Past Surgical History:   Procedure Laterality Date    HX CATARACT REMOVAL       Allergies   Allergen Reactions    Pcn [Penicillins] Unknown (comments)     Blood pressure 157/87, pulse 66, temperature 97 °F (36.1 °C), temperature source Oral, resp. rate 18, height 5' 10\" (1.778 m), weight 162 lb 8 oz (73.7 kg), SpO2 98 %. Physical Exam   Constitutional: He is oriented to person, place, and time. He appears well-developed and well-nourished. No distress.    HENT:   Head: Normocephalic. Nose: Nose normal.   Mouth/Throat: Oropharynx is clear and moist.   Eyes: Conjunctivae are normal.   Neck: Neck supple. No carotid bruits   Cardiovascular: Normal rate, regular rhythm and normal heart sounds. No murmur heard. Pulmonary/Chest: Effort normal and breath sounds normal. No respiratory distress. Abdominal: Soft. Neurological: He is alert and oriented to person, place, and time. Skin: Skin is warm. Psychiatric: He has a normal mood and affect. Vitals reviewed. Results for orders placed or performed in visit on 66/24/02   METABOLIC PANEL, COMPREHENSIVE   Result Value Ref Range    Glucose 137 (H) 65 - 99 mg/dL    BUN 28 (H) 8 - 27 mg/dL    Creatinine 1.77 (H) 0.76 - 1.27 mg/dL    GFR est non-AA 38 (L) >59 mL/min/1.73    GFR est AA 43 (L) >59 mL/min/1.73    BUN/Creatinine ratio 16 10 - 24    Sodium 142 134 - 144 mmol/L    Potassium 4.3 3.5 - 5.2 mmol/L    Chloride 100 96 - 106 mmol/L    CO2 30 (H) 18 - 29 mmol/L    Calcium 8.3 (L) 8.6 - 10.2 mg/dL    Protein, total 6.0 6.0 - 8.5 g/dL    Albumin 3.8 3.5 - 4.8 g/dL    GLOBULIN, TOTAL 2.2 1.5 - 4.5 g/dL    A-G Ratio 1.7 1.2 - 2.2    Bilirubin, total 0.4 0.0 - 1.2 mg/dL    Alk.  phosphatase 61 39 - 117 IU/L    AST (SGOT) 20 0 - 40 IU/L    ALT (SGPT) 12 0 - 44 IU/L   CKD REPORT   Result Value Ref Range    Interpretation Note        ASSESSMENT and PLAN  HTN   Increase Amlodipine to 10mg daily   Low sodium diet   RTO 3 months   Monitor BP at home  Hypothyroid   Check TSH and adjust Synthroid if needed  CKD2   Avoid all NSAIDs   RTO 3 months and will check labs

## 2017-09-14 LAB
SPECIMEN STATUS REPORT, ROLRST: NORMAL
TSH SERPL DL<=0.005 MIU/L-ACNC: 2.72 UIU/ML (ref 0.45–4.5)

## 2017-10-12 DIAGNOSIS — E78.00 HYPERCHOLESTEROLEMIA: ICD-10-CM

## 2017-10-12 RX ORDER — SIMVASTATIN 20 MG/1
TABLET, FILM COATED ORAL
Qty: 90 TAB | Refills: 0 | Status: SHIPPED | OUTPATIENT
Start: 2017-10-12 | End: 2018-01-14 | Stop reason: SDUPTHER

## 2017-12-12 ENCOUNTER — OFFICE VISIT (OUTPATIENT)
Dept: FAMILY MEDICINE CLINIC | Age: 72
End: 2017-12-12

## 2017-12-12 VITALS
OXYGEN SATURATION: 98 % | HEIGHT: 61 IN | SYSTOLIC BLOOD PRESSURE: 136 MMHG | BODY MASS INDEX: 31.53 KG/M2 | TEMPERATURE: 96.7 F | WEIGHT: 167 LBS | DIASTOLIC BLOOD PRESSURE: 74 MMHG | RESPIRATION RATE: 18 BRPM | HEART RATE: 67 BPM

## 2017-12-12 DIAGNOSIS — I10 ESSENTIAL HYPERTENSION: ICD-10-CM

## 2017-12-12 DIAGNOSIS — N18.2 CKD (CHRONIC KIDNEY DISEASE) STAGE 2, GFR 60-89 ML/MIN: ICD-10-CM

## 2017-12-12 DIAGNOSIS — E03.4 HYPOTHYROIDISM DUE TO ACQUIRED ATROPHY OF THYROID: Primary | ICD-10-CM

## 2017-12-12 LAB
BILIRUB UR QL STRIP: NEGATIVE
GLUCOSE UR-MCNC: NEGATIVE MG/DL
KETONES P FAST UR STRIP-MCNC: NEGATIVE MG/DL
PH UR STRIP: 5.5 [PH] (ref 4.6–8)
PROT UR QL STRIP: NORMAL
SP GR UR STRIP: 1.02 (ref 1–1.03)
UA UROBILINOGEN AMB POC: NORMAL (ref 0.2–1)
URINALYSIS CLARITY POC: CLEAR
URINALYSIS COLOR POC: YELLOW
URINE BLOOD POC: NORMAL
URINE LEUKOCYTES POC: NEGATIVE
URINE NITRITES POC: NEGATIVE

## 2017-12-12 NOTE — MR AVS SNAPSHOT
Visit Information Date & Time Provider Department Dept. Phone Encounter #  
 12/12/2017  1:20 PM Julio Cesar Morel MD [place] FOR BEHAVIORAL MEDICINE Primary Care [phone] [id number] Follow-up Instructions Return in about 6 months (around 6/12/2018). Follow-up and Disposition History Upcoming Health Maintenance Date Due FOBT Q 1 YEAR AGE 50-75 8/1/2018 MEDICARE YEARLY EXAM 8/2/2018 GLAUCOMA SCREENING Q2Y 8/8/2018 DTaP/Tdap/Td series (2 - Td) 8/1/2027 Allergies as of 12/12/2017  Review Complete On: 12/12/2017 By: Julio Cesar Morel MD  
  
 Severity Noted Reaction Type Reactions Pcn [Penicillins]  09/24/2013    Unknown (comments) Current Immunizations  Never Reviewed Name Date Influenza High Dose Vaccine PF 9/22/2017 Influenza Vaccine 9/30/2016, 9/29/2014, 1/6/2013 Pneumococcal Conjugate (PCV-13) 1/2/2017 Pneumococcal Vaccine (Unspecified Type) 11/18/2010 Td 4/30/2010 Not reviewed this visit You Were Diagnosed With   
  
 Codes Comments Hypothyroidism due to acquired atrophy of thyroid    -  Primary ICD-10-CM: E03.4 ICD-9-CM: 244.8, 246.8 Essential hypertension     ICD-10-CM: I10 
ICD-9-CM: 401.9 CKD (chronic kidney disease) stage 2, GFR 60-89 ml/min     ICD-10-CM: N18.2 ICD-9-CM: 599. 2 Vitals BP Pulse Temp Resp Height(growth percentile) Weight(growth percentile) 136/74 67 96.7 °F (35.9 °C) 18 5' 1\" (1.549 m) 167 lb (75.8 kg) SpO2 BMI Smoking Status 98% 31.55 kg/m2 Former Smoker Vitals History BMI and BSA Data Body Mass Index Body Surface Area 31.55 kg/m 2 1.81 m 2 Preferred Pharmacy Pharmacy Name Phone [address], [address] AT [facility] OF  [address] Oram[facility] [place]er [phone] Your Updated Medication List  
  
   
This list is accurate as of: 12/12/17  2:15 PM.  Always use your most recent med list.  
  
  
  
  
 amLODIPine 10 mg tablet Commonly known as:  Tereza Almaz Take 1 Tab by mouth daily. aspirin delayed-release 81 mg tablet Take 1 Tab by mouth daily. levothyroxine 50 mcg tablet Commonly known as:  SYNTHROID  
TAKE 1 TABLET BY MOUTH DAILY BEFORE BREAKFAST  
  
 simvastatin 20 mg tablet Commonly known as:  ZOCOR  
TAKE 1 TABLET BY MOUTH EVERY NIGHT We Performed the Following AMB POC URINALYSIS DIP STICK AUTO W/O MICRO [14713 CPT(R)] METABOLIC PANEL, COMPREHENSIVE [87008 CPT(R)] Follow-up Instructions Return in about 6 months (around 6/12/2018). Introducing [facility] & [facility] SERVICES! Dear Isaura More: Thank you for requesting a Playfire account. Our records indicate that you already have an active Playfire account. You can access your account anytime at [email or web address]. [employer]/Flint and Tinder Did you know that you can access your hospital and ER discharge instructions at any time in Playfire? You can also review all of your test results from your hospital stay or ER visit. Additional Information If you have questions, please visit the Frequently Asked Questions section of the Playfire website at [email or web address]/[email or web address] [email or web address] Remember, Playfire is NOT to be used for urgent needs. For medical emergencies, dial 911. Now available from your iPhone and Android! Please provide this summary of care documentation to your next provider. Your primary care clinician is listed as Dionicio Chun. If you have any questions after today's visit, please call [phone].

## 2017-12-12 NOTE — PROGRESS NOTES
HISTORY OF PRESENT ILLNESS  Mukesh Urrutia III is a 67 y.o. male. Follow-up   Pertinent negatives include no chest pain, no abdominal pain and no headaches. Hypertension  Blood pressures at home have been 379-857 systolic and 97-94 diastolic. Has been following a low sodium diet . Exercises quite a bit. Taking meds daily. On Amlodipine 10mg daily. Denies chest pain or shortness of breath. Having some edema. Hypothyroidism  Recently placed on Synthroid. CKD  Last Creatinine was 1.7. He has stopped NSAIDs. We took him off Diovan. Urine checked in April and there was no protein. His creatinine has ranges from 1 to 1.8 over the past few years. Prior to placing him on Diovan though it was 1.1 and went up to 1.8 in a few weeks thus making the decision to stop the medication. Urine has been negative for protein. Review of Systems   Constitutional: Positive for malaise/fatigue. Negative for fever. HENT: Negative for congestion and sore throat. Respiratory: Negative for cough. Cardiovascular: Negative for chest pain and palpitations. Gastrointestinal: Positive for heartburn. Negative for abdominal pain. Genitourinary: Negative for dysuria, flank pain, frequency, hematuria and urgency. Musculoskeletal: Negative for myalgias. Neurological: Negative for dizziness and headaches. Psychiatric/Behavioral: Negative for depression. Past Medical History:   Diagnosis Date    CKD (chronic kidney disease) stage 2, GFR 60-89 ml/min 8/1/2017    History of hepatitis A     Hypercholesterolemia     Hypertension     Polio     AGE 7     Past Surgical History:   Procedure Laterality Date    HX CATARACT REMOVAL       Allergies   Allergen Reactions    Pcn [Penicillins] Unknown (comments)     Blood pressure 136/74, pulse 67, temperature 96.7 °F (35.9 °C), resp. rate 18, height 5' 1\" (1.549 m), weight 167 lb (75.8 kg), SpO2 98 %.       Physical Exam   Constitutional: He is oriented to person, place, and time. He appears well-developed and well-nourished. No distress. HENT:   Head: Normocephalic. Nose: Nose normal.   Mouth/Throat: Oropharynx is clear and moist.   Neck: Neck supple. No carotid bruits   Cardiovascular: Normal rate, regular rhythm and normal heart sounds. No murmur heard. Pulmonary/Chest: Effort normal and breath sounds normal. No respiratory distress. Musculoskeletal: He exhibits edema. 2+ BLE edema   Neurological: He is alert and oriented to person, place, and time. Skin: Skin is warm. Psychiatric: He has a normal mood and affect. Vitals reviewed.       ASSESSMENT and PLAN  HTN   Continue  Amlodipine 10mg daily   Low sodium diet   RTO 3 months   Monitor BP at home  Hypothyroid   Continue Synthroid   CKD2   Avoid all NSAIDs   Check CMP and urine   RTO 6 months

## 2017-12-13 LAB
ALBUMIN SERPL-MCNC: 3.9 G/DL (ref 3.5–4.8)
ALBUMIN/GLOB SERPL: 2.1 {RATIO} (ref 1.2–2.2)
ALP SERPL-CCNC: 69 IU/L (ref 39–117)
ALT SERPL-CCNC: 9 IU/L (ref 0–44)
AST SERPL-CCNC: 16 IU/L (ref 0–40)
BILIRUB SERPL-MCNC: 0.3 MG/DL (ref 0–1.2)
BUN SERPL-MCNC: 29 MG/DL (ref 8–27)
BUN/CREAT SERPL: 17 (ref 10–24)
CALCIUM SERPL-MCNC: 8.4 MG/DL (ref 8.6–10.2)
CHLORIDE SERPL-SCNC: 96 MMOL/L (ref 96–106)
CO2 SERPL-SCNC: 30 MMOL/L (ref 18–29)
CREAT SERPL-MCNC: 1.7 MG/DL (ref 0.76–1.27)
GFR SERPLBLD CREATININE-BSD FMLA CKD-EPI: 39 ML/MIN/1.73
GFR SERPLBLD CREATININE-BSD FMLA CKD-EPI: 46 ML/MIN/1.73
GLOBULIN SER CALC-MCNC: 1.9 G/DL (ref 1.5–4.5)
GLUCOSE SERPL-MCNC: 127 MG/DL (ref 65–99)
INTERPRETATION: NORMAL
POTASSIUM SERPL-SCNC: 3.7 MMOL/L (ref 3.5–5.2)
PROT SERPL-MCNC: 5.8 G/DL (ref 6–8.5)
SODIUM SERPL-SCNC: 139 MMOL/L (ref 134–144)

## 2017-12-13 NOTE — PROGRESS NOTES
Creatinine is stable. There is some protein in the urine.  Would like for him to see renal. Find out if he wants to see Dr Angelo Gold or someone in [place]

## 2018-03-12 RX ORDER — AMLODIPINE BESYLATE 10 MG/1
TABLET ORAL
Qty: 90 TAB | Refills: 0 | Status: SHIPPED | OUTPATIENT
Start: 2018-03-12 | End: 2018-06-08 | Stop reason: SDUPTHER

## 2018-06-08 RX ORDER — AMLODIPINE BESYLATE 10 MG/1
TABLET ORAL
Qty: 90 TAB | Refills: 0 | Status: SHIPPED | OUTPATIENT
Start: 2018-06-08 | End: 2018-09-03 | Stop reason: SDUPTHER

## 2018-06-25 ENCOUNTER — OFFICE VISIT (OUTPATIENT)
Dept: FAMILY MEDICINE CLINIC | Age: 73
End: 2018-06-25

## 2018-06-25 VITALS
TEMPERATURE: 98 F | RESPIRATION RATE: 14 BRPM | SYSTOLIC BLOOD PRESSURE: 134 MMHG | OXYGEN SATURATION: 97 % | WEIGHT: 162.13 LBS | HEART RATE: 60 BPM | BODY MASS INDEX: 24.01 KG/M2 | HEIGHT: 69 IN | DIASTOLIC BLOOD PRESSURE: 74 MMHG

## 2018-06-25 DIAGNOSIS — R74.8 ELEVATED CREATINE KINASE: ICD-10-CM

## 2018-06-25 DIAGNOSIS — E03.4 HYPOTHYROIDISM DUE TO ACQUIRED ATROPHY OF THYROID: ICD-10-CM

## 2018-06-25 DIAGNOSIS — N18.32 CKD STAGE G3B/A1, GFR 30-44 AND ALBUMIN CREATININE RATIO <30 MG/G (HCC): Primary | ICD-10-CM

## 2018-06-25 DIAGNOSIS — E78.00 HYPERCHOLESTEROLEMIA: ICD-10-CM

## 2018-06-25 DIAGNOSIS — I10 ESSENTIAL HYPERTENSION: ICD-10-CM

## 2018-06-25 PROBLEM — N18.2 CKD (CHRONIC KIDNEY DISEASE) STAGE 2, GFR 60-89 ML/MIN: Status: RESOLVED | Noted: 2017-08-01 | Resolved: 2018-06-25

## 2018-06-25 NOTE — PROGRESS NOTES
HISTORY OF PRESENT ILLNESS  Hansa Mars III is a 68 y.o. male. Hypertension    Associated symptoms include malaise/fatigue. Pertinent negatives include no chest pain, no palpitations, no headaches and no dizziness. Cholesterol Problem   Pertinent negatives include no chest pain, no abdominal pain and no headaches. Follow-up   Pertinent negatives include no chest pain, no abdominal pain and no headaches. Hypertension  Blood pressures at home have been 927-448 systolic and 04-43 diastolic. Has been following a low sodium diet . Exercises quite a bit. Taking meds daily. On Amlodipine 10mg daily. Denies chest pain or shortness of breath. Having some edema. Hypothyroidism  Recently placed on Synthroid. On 50 mcg Synthroid. CKD  Last Creatinine was 1.7. He has stopped NSAIDs. We took him off Diovan. Urine checked in April and there was no protein. His creatinine has ranges from 1 to 1.8 over the past few years. Prior to placing him on Diovan though it was 1.1 and went up to 1.8 in a few weeks thus making the decision to stop the medication. Urine has been negative for protein. Her for recheck. Hyperlipidemia  On a statin. Follows a low fat diet. 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 Sleeping better. Going to [facility] in a few days for a visit. Review of Systems   Constitutional: Positive for malaise/fatigue. Negative for fever. HENT: Negative for congestion and sore throat. Respiratory: Negative for cough. Cardiovascular: Negative for chest pain and palpitations. Gastrointestinal: Positive for heartburn. Negative for abdominal pain. Genitourinary: Negative for dysuria, flank pain, frequency, hematuria and urgency. Musculoskeletal: Negative for myalgias. Neurological: Negative for dizziness and headaches. Psychiatric/Behavioral: Negative for depression.      Past Medical History:   Diagnosis Date    CKD stage G3b/A1, GFR 30-44 and albumin creatinine ratio <30 mg/g  History of hepatitis A     Hypercholesterolemia     Hypertension     Polio     AGE 7     Past Surgical History:   Procedure Laterality Date    HX CATARACT REMOVAL       Allergies   Allergen Reactions    Pcn [Penicillins] Unknown (comments)     Blood pressure 134/74, pulse 60, temperature 98 °F (36.7 °C), resp. rate 14, height 5' 9\" (1.753 m), weight 162 lb 2 oz (73.5 kg), SpO2 97 %. Physical Exam   Constitutional: He is oriented to person, place, and time. He appears well-developed and well-nourished. No distress. HENT:   Head: Normocephalic. Nose: Nose normal.   Mouth/Throat: Oropharynx is clear and moist.   Neck: Neck supple. No carotid bruits   Cardiovascular: Normal rate, regular rhythm and normal heart sounds. No murmur heard. Pulmonary/Chest: Effort normal and breath sounds normal. No respiratory distress. Musculoskeletal: He exhibits edema. 1+ BLE edema   Neurological: He is alert and oriented to person, place, and time. Skin: Skin is warm. Psychiatric: He has a normal mood and affect. Vitals reviewed.       ASSESSMENT and PLAN  HTN   Continue  Amlodipine 10mg daily   Low sodium diet   RTO 3 months   Monitor BP at home  Hypothyroid   Continue Synthroid    Check TSH and adjust meds if needed  CKD2   Avoid all NSAIDs   Check CMP     RTO 6 months  Hyperlipidemia   Continue statin   Low fat diet

## 2018-06-25 NOTE — PROGRESS NOTES
1. Have you been to the ER, urgent care clinic since your last visit? Hospitalized since your last visit? No    2. Have you seen or consulted any other health care providers outside of the [address] [place] [address] since your last visit? Include any pap smears or colon screening.  No

## 2018-06-29 ENCOUNTER — LAB ONLY (OUTPATIENT)
Dept: FAMILY MEDICINE CLINIC | Age: 73
End: 2018-06-29

## 2018-06-29 ENCOUNTER — DOCUMENTATION ONLY (OUTPATIENT)
Dept: FAMILY MEDICINE CLINIC | Age: 73
End: 2018-06-29

## 2018-06-29 DIAGNOSIS — R74.8 ELEVATED CREATINE KINASE: ICD-10-CM

## 2018-06-29 DIAGNOSIS — N18.32 CKD STAGE G3B/A1, GFR 30-44 AND ALBUMIN CREATININE RATIO <30 MG/G (HCC): ICD-10-CM

## 2018-06-29 NOTE — PROGRESS NOTES
Creatinine is higher. Please refer to Dr Elisha Willis. Have him RTO to give a urine sample to check for protein.

## 2018-06-29 NOTE — PROGRESS NOTES
Patient aware of labs and referral request sent to Dr. Rony Max patient will be in today for urine sample

## 2018-06-30 LAB
ALBUMIN SERPL-MCNC: 4.2 G/DL (ref 3.5–4.8)
ALBUMIN/CREAT UR: 1230.8 MG/G CREAT (ref 0–30)
ALBUMIN/GLOB SERPL: 1.8 {RATIO} (ref 1.2–2.2)
ALP SERPL-CCNC: 87 IU/L (ref 39–117)
ALT SERPL-CCNC: 12 IU/L (ref 0–44)
AST SERPL-CCNC: 20 IU/L (ref 0–40)
BASOPHILS # BLD AUTO: 0.1 X10E3/UL (ref 0–0.2)
BASOPHILS NFR BLD AUTO: 1 %
BILIRUB SERPL-MCNC: 0.5 MG/DL (ref 0–1.2)
BUN SERPL-MCNC: 32 MG/DL (ref 8–27)
BUN/CREAT SERPL: 15 (ref 10–24)
CALCIUM SERPL-MCNC: 8.9 MG/DL (ref 8.6–10.2)
CHLORIDE SERPL-SCNC: 98 MMOL/L (ref 96–106)
CO2 SERPL-SCNC: 23 MMOL/L (ref 20–29)
CREAT SERPL-MCNC: 2.15 MG/DL (ref 0.76–1.27)
CREAT UR-MCNC: 68.1 MG/DL
EOSINOPHIL # BLD AUTO: 0.2 X10E3/UL (ref 0–0.4)
EOSINOPHIL NFR BLD AUTO: 2 %
ERYTHROCYTE [DISTWIDTH] IN BLOOD BY AUTOMATED COUNT: 13.8 % (ref 12.3–15.4)
GLOBULIN SER CALC-MCNC: 2.3 G/DL (ref 1.5–4.5)
GLUCOSE SERPL-MCNC: 81 MG/DL (ref 65–99)
HCT VFR BLD AUTO: 41 % (ref 37.5–51)
HGB BLD-MCNC: 13.7 G/DL (ref 13–17.7)
IMM GRANULOCYTES # BLD: 0 X10E3/UL (ref 0–0.1)
IMM GRANULOCYTES NFR BLD: 0 %
INTERPRETATION: NORMAL
INTERPRETATION: NORMAL
LYMPHOCYTES # BLD AUTO: 2.4 X10E3/UL (ref 0.7–3.1)
LYMPHOCYTES NFR BLD AUTO: 32 %
Lab: NORMAL
MCH RBC QN AUTO: 29.2 PG (ref 26.6–33)
MCHC RBC AUTO-ENTMCNC: 33.4 G/DL (ref 31.5–35.7)
MCV RBC AUTO: 87 FL (ref 79–97)
MICROALBUMIN UR-MCNC: 838.2 UG/ML
MONOCYTES # BLD AUTO: 0.8 X10E3/UL (ref 0.1–0.9)
MONOCYTES NFR BLD AUTO: 11 %
NEUTROPHILS # BLD AUTO: 3.9 X10E3/UL (ref 1.4–7)
NEUTROPHILS NFR BLD AUTO: 54 %
PLATELET # BLD AUTO: 235 X10E3/UL (ref 150–379)
POTASSIUM SERPL-SCNC: 4.4 MMOL/L (ref 3.5–5.2)
PROT SERPL-MCNC: 6.5 G/DL (ref 6–8.5)
RBC # BLD AUTO: 4.69 X10E6/UL (ref 4.14–5.8)
SODIUM SERPL-SCNC: 139 MMOL/L (ref 134–144)
TSH SERPL DL<=0.005 MIU/L-ACNC: 2.98 UIU/ML (ref 0.45–4.5)
WBC # BLD AUTO: 7.4 X10E3/UL (ref 3.4–10.8)

## 2018-07-15 DIAGNOSIS — E78.00 HYPERCHOLESTEROLEMIA: ICD-10-CM

## 2018-07-16 RX ORDER — SIMVASTATIN 20 MG/1
TABLET, FILM COATED ORAL
Qty: 90 TAB | Refills: 0 | Status: SHIPPED | OUTPATIENT
Start: 2018-07-16 | End: 2018-10-11 | Stop reason: SDUPTHER

## 2018-07-21 RX ORDER — LEVOTHYROXINE SODIUM 50 UG/1
TABLET ORAL
Qty: 90 TAB | Refills: 0 | Status: SHIPPED | OUTPATIENT
Start: 2018-07-21 | End: 2018-10-16 | Stop reason: SDUPTHER

## 2018-09-04 RX ORDER — AMLODIPINE BESYLATE 10 MG/1
TABLET ORAL
Qty: 90 TAB | Refills: 0 | Status: SHIPPED | OUTPATIENT
Start: 2018-09-04 | End: 2018-12-09 | Stop reason: SDUPTHER

## 2018-10-11 DIAGNOSIS — E78.00 HYPERCHOLESTEROLEMIA: ICD-10-CM

## 2018-10-11 RX ORDER — SIMVASTATIN 20 MG/1
TABLET, FILM COATED ORAL
Qty: 90 TAB | Refills: 0 | Status: SHIPPED | OUTPATIENT
Start: 2018-10-11 | End: 2019-01-08 | Stop reason: SDUPTHER

## 2018-10-16 RX ORDER — LEVOTHYROXINE SODIUM 50 UG/1
TABLET ORAL
Qty: 90 TAB | Refills: 0 | Status: SHIPPED | OUTPATIENT
Start: 2018-10-16 | End: 2019-01-16 | Stop reason: SDUPTHER

## 2018-12-10 RX ORDER — AMLODIPINE BESYLATE 10 MG/1
TABLET ORAL
Qty: 90 TAB | Refills: 0 | Status: SHIPPED | OUTPATIENT
Start: 2018-12-10 | End: 2019-02-05 | Stop reason: ALTCHOICE

## 2019-01-08 DIAGNOSIS — E78.00 HYPERCHOLESTEROLEMIA: ICD-10-CM

## 2019-01-08 RX ORDER — SIMVASTATIN 20 MG/1
TABLET, FILM COATED ORAL
Qty: 90 TAB | Refills: 0 | Status: SHIPPED | OUTPATIENT
Start: 2019-01-08 | End: 2019-04-08 | Stop reason: SDUPTHER

## 2019-01-16 RX ORDER — LEVOTHYROXINE SODIUM 50 UG/1
TABLET ORAL
Qty: 90 TAB | Refills: 0 | Status: SHIPPED | OUTPATIENT
Start: 2019-01-16 | End: 2019-04-14 | Stop reason: SDUPTHER

## 2019-04-08 DIAGNOSIS — E78.00 HYPERCHOLESTEROLEMIA: ICD-10-CM

## 2019-04-08 RX ORDER — SIMVASTATIN 20 MG/1
TABLET, FILM COATED ORAL
Qty: 90 TAB | Refills: 0 | Status: SHIPPED | OUTPATIENT
Start: 2019-04-08 | End: 2019-07-05 | Stop reason: SDUPTHER

## 2019-04-14 RX ORDER — LEVOTHYROXINE SODIUM 50 UG/1
TABLET ORAL
Qty: 90 TAB | Refills: 0 | Status: SHIPPED | OUTPATIENT
Start: 2019-04-14 | End: 2019-06-25

## 2019-04-23 RX ORDER — AMLODIPINE BESYLATE 10 MG/1
TABLET ORAL
Qty: 90 TAB | Refills: 0 | Status: SHIPPED | OUTPATIENT
Start: 2019-04-23 | End: 2019-06-24 | Stop reason: DRUGHIGH

## 2019-06-25 DIAGNOSIS — E03.4 HYPOTHYROIDISM DUE TO ACQUIRED ATROPHY OF THYROID: Primary | ICD-10-CM

## 2019-06-25 RX ORDER — LEVOTHYROXINE SODIUM 75 UG/1
75 TABLET ORAL
Qty: 90 TAB | Refills: 0 | Status: SHIPPED | OUTPATIENT
Start: 2019-06-25 | End: 2019-09-19 | Stop reason: SDUPTHER

## 2019-07-05 DIAGNOSIS — E78.00 HYPERCHOLESTEROLEMIA: ICD-10-CM

## 2019-07-07 RX ORDER — SIMVASTATIN 20 MG/1
TABLET, FILM COATED ORAL
Qty: 90 TAB | Refills: 0 | Status: SHIPPED | OUTPATIENT
Start: 2019-07-07 | End: 2019-10-03 | Stop reason: SDUPTHER

## 2019-09-19 RX ORDER — LEVOTHYROXINE SODIUM 75 UG/1
TABLET ORAL
Qty: 90 TAB | Refills: 0 | Status: SHIPPED | OUTPATIENT
Start: 2019-09-19 | End: 2019-12-22

## 2019-10-03 DIAGNOSIS — E78.00 HYPERCHOLESTEROLEMIA: ICD-10-CM

## 2019-10-03 DIAGNOSIS — I10 ESSENTIAL HYPERTENSION: ICD-10-CM

## 2019-10-03 RX ORDER — SIMVASTATIN 20 MG/1
TABLET, FILM COATED ORAL
Qty: 90 TAB | Refills: 0 | Status: SHIPPED | OUTPATIENT
Start: 2019-10-03 | End: 2020-01-01

## 2019-10-03 RX ORDER — LOSARTAN POTASSIUM 100 MG/1
TABLET ORAL
Qty: 90 TAB | Refills: 0 | Status: SHIPPED | OUTPATIENT
Start: 2019-10-03 | End: 2021-08-25

## 2019-12-22 RX ORDER — LEVOTHYROXINE SODIUM 75 UG/1
TABLET ORAL
Qty: 90 TAB | Refills: 0 | Status: SHIPPED | OUTPATIENT
Start: 2019-12-22 | End: 2020-03-17

## 2020-01-01 DIAGNOSIS — E78.00 HYPERCHOLESTEROLEMIA: ICD-10-CM

## 2020-01-01 RX ORDER — SIMVASTATIN 20 MG/1
TABLET, FILM COATED ORAL
Qty: 90 TAB | Refills: 0 | Status: SHIPPED | OUTPATIENT
Start: 2020-01-01 | End: 2020-03-31

## 2020-03-17 RX ORDER — LEVOTHYROXINE SODIUM 75 UG/1
TABLET ORAL
Qty: 90 TAB | Refills: 0 | Status: SHIPPED | OUTPATIENT
Start: 2020-03-17 | End: 2020-06-09 | Stop reason: SDUPTHER

## 2020-03-17 NOTE — TELEPHONE ENCOUNTER
Pts daughter Masood Oaroque called very upset because pt needs to see either Joseph Presley or Leonarda Butterfield before his med is refilled.  Pts daughter is not on his disclosure

## 2020-03-31 DIAGNOSIS — E78.00 HYPERCHOLESTEROLEMIA: ICD-10-CM

## 2020-03-31 RX ORDER — SIMVASTATIN 20 MG/1
TABLET, FILM COATED ORAL
Qty: 90 TAB | Refills: 0 | Status: SHIPPED | OUTPATIENT
Start: 2020-03-31 | End: 2020-06-09 | Stop reason: SDUPTHER

## 2020-11-18 PROBLEM — I25.10 CORONARY ARTERY DISEASE INVOLVING NATIVE CORONARY ARTERY OF NATIVE HEART WITHOUT ANGINA PECTORIS: Status: ACTIVE | Noted: 2020-11-18

## 2020-11-18 PROBLEM — Z95.5 HISTORY OF CORONARY ARTERY STENT PLACEMENT: Status: ACTIVE | Noted: 2020-11-18

## 2020-11-25 ENCOUNTER — OFFICE VISIT (OUTPATIENT)
Dept: SURGERY | Age: 75
End: 2020-11-25

## 2020-11-25 VITALS
TEMPERATURE: 97.5 F | OXYGEN SATURATION: 100 % | DIASTOLIC BLOOD PRESSURE: 63 MMHG | HEIGHT: 69 IN | RESPIRATION RATE: 18 BRPM | SYSTOLIC BLOOD PRESSURE: 149 MMHG | WEIGHT: 159 LBS | HEART RATE: 75 BPM | BODY MASS INDEX: 23.55 KG/M2

## 2020-11-25 DIAGNOSIS — Z12.11 COLON CANCER SCREENING: Primary | ICD-10-CM

## 2020-11-25 NOTE — PROGRESS NOTES
Liz Willard is a 76 y.o. male who presents today with the following:  Chief Complaint   Patient presents with    Colon Cancer Screening       HPI    63-year-old male who presents to us for possible scheduling of screening colonoscopy since he is being placed on a kidney transplant list.  Patient states that since he was 61 and diagnosed with hypertension he had progressive worsening kidney function and is reached the point where he is now started dialysis 2 weeks ago. Unfortunately about 6 weeks ago his wife had a brain aneurysm and after a 5-day hospitalization passed away. It was found that she was an organ donor and she was a tissue match with Mr. Jose Delgado. Plans were for possible transplant during that time. During his preoperative work-up unfortunately was found that he had some degree of coronary artery disease and required stenting. He was placed on Plavix at that point. He was not capable to have the transplant at that time because of the cardiac issues. Since then as mentioned he has started dialysis. He is not sure now if the transplant team requires a colonoscopy and he is under the understanding that they do not. He presents today for discussing this further. He states that his last colonoscopy was about 12 years ago and was negative. He has no family history for colon cancer. He denies any melena or hematochezia or abdominal pain or constipation.     Past Medical History:   Diagnosis Date    CKD stage G3b/A1, GFR 30-44 and albumin creatinine ratio <30 mg/g     History of hepatitis A     Hypercholesterolemia     Hypertension     Polio     AGE 7       Past Surgical History:   Procedure Laterality Date    HX CATARACT REMOVAL         Social History     Socioeconomic History    Marital status:      Spouse name: Not on file    Number of children: Not on file    Years of education: Not on file    Highest education level: Not on file   Occupational History    Not on file   Social Needs    Financial resource strain: Not on file    Food insecurity     Worry: Not on file     Inability: Not on file    Transportation needs     Medical: Not on file     Non-medical: Not on file   Tobacco Use    Smoking status: Former Smoker    Smokeless tobacco: Never Used   Substance and Sexual Activity    Alcohol use: Yes     Alcohol/week: 0.0 standard drinks    Drug use: No    Sexual activity: Not on file   Lifestyle    Physical activity     Days per week: Not on file     Minutes per session: Not on file    Stress: Not on file   Relationships    Social connections     Talks on phone: Not on file     Gets together: Not on file     Attends Nondenominational service: Not on file     Active member of club or organization: Not on file     Attends meetings of clubs or organizations: Not on file     Relationship status: Not on file    Intimate partner violence     Fear of current or ex partner: Not on file     Emotionally abused: Not on file     Physically abused: Not on file     Forced sexual activity: Not on file   Other Topics Concern    Not on file   Social History Narrative    Not on file       Family History   Problem Relation Age of Onset    Hypertension Father     Cancer Father         woodsman cancer    Heart Disease Maternal Grandfather     Dementia Mother     Macular Degen Mother     Cancer Sister         breast       Allergies   Allergen Reactions    Pcn [Penicillins] Unknown (comments)       Current Outpatient Medications   Medication Sig    furosemide (LASIX) 20 mg tablet Take 1 Tab by mouth daily.  sevelamer carbonate (RENVELA) 800 mg tab tab Take 1 Tab by mouth.  atorvastatin (LIPITOR) 80 mg tablet Take 1 Tab by mouth daily.  simvastatin (ZOCOR) 20 mg tablet TAKE 1 TABLET BY MOUTH EVERY NIGHT    doxazosin (CARDURA) 2 mg tablet Take 2 mg by mouth daily.     levothyroxine (SYNTHROID) 75 mcg tablet TAKE 1 TABLET BY MOUTH DAILY BEFORE BREAKFAST    aspirin delayed-release 81 mg tablet Take 1 Tab by mouth daily.  amLODIPine (NORVASC) 10 mg tablet TAKE 1 TABLET BY MOUTH DAILY    losartan (COZAAR) 100 mg tablet TAKE 1 TABLET BY MOUTH EVERY DAY    amLODIPine (NORVASC) 5 mg tablet Take 1.5 Tabs by mouth daily. No current facility-administered medications for this visit. The above histories, medications and allergies have been reviewed. ROS    Visit Vitals  BP (!) 149/63 (BP 1 Location: Left arm, BP Patient Position: Sitting)   Pulse 75   Temp 97.5 °F (36.4 °C) (Temporal)   Resp 18   Ht 5' 9\" (1.753 m)   Wt 159 lb (72.1 kg)   SpO2 100%   BMI 23.48 kg/m²     Physical Exam  Constitutional:       Appearance: Normal appearance. Neurological:      Mental Status: He is alert. 1. Colon cancer screening  We had a very long discussion concerning the options. Ideally if he is going to have a colonoscopy I like to discontinue his Plavix in the event that any biopsies need to be taken. Other options of include the possibility of having a colonoscopy at [facility] where he is at his other treatments. He is currently being dialyzed at [address] [place] [address]. He is under the understanding from the transplant team that he may not require colonoscopy at this point and has full intentions to defer. He will ascertain with the transplant team if that is correct or not. If he decides that he needs colonoscopy we will have a discussion with cardiology about discontinuing of the Plavix. If we need to perform colonoscopy on Plavix it is an option although certainly would not like to perform any kind of biopsies if he was on Plavix at that time. He will follow-up as needed. Follow-up and Dispositions    · Return if symptoms worsen or fail to improve.          Masoud Morrison MD

## 2020-12-25 PROBLEM — Z12.11 COLON CANCER SCREENING: Status: RESOLVED | Noted: 2020-11-25 | Resolved: 2020-12-25

## 2021-04-20 ENCOUNTER — HOSPITAL ENCOUNTER (OUTPATIENT)
Dept: LAB | Age: 76
Discharge: HOME | End: 2021-04-20
Payer: MEDICARE

## 2021-04-20 LAB
ALBUMIN SERPL-MCNC: 3.8 G/DL (ref 3.5–5)
ALBUMIN/GLOB SERPL: 1.3 {RATIO} (ref 1.1–2.2)
ALP SERPL-CCNC: 65 U/L (ref 45–117)
ALT SERPL-CCNC: 21 U/L (ref 12–78)
ANION GAP SERPL CALC-SCNC: 10 MMOL/L (ref 5–15)
AST SERPL-CCNC: 36 U/L (ref 15–37)
BASOPHILS # BLD: 0.1 K/UL (ref 0–0.1)
BASOPHILS NFR BLD: 1 % (ref 0–1)
BILIRUB SERPL-MCNC: 0.3 MG/DL (ref 0.2–1)
BUN SERPL-MCNC: 47 MG/DL (ref 6–20)
BUN/CREAT SERPL: 7 (ref 12–20)
CALCIUM SERPL-MCNC: 9.1 MG/DL (ref 8.5–10.1)
CHLORIDE SERPL-SCNC: 99 MMOL/L (ref 97–108)
CO2 SERPL-SCNC: 29 MMOL/L (ref 21–32)
CREAT SERPL-MCNC: 6.76 MG/DL (ref 0.7–1.3)
DIFFERENTIAL METHOD BLD: ABNORMAL
EOSINOPHIL # BLD: 0.3 K/UL (ref 0–0.4)
EOSINOPHIL NFR BLD: 5 % (ref 0–7)
ERYTHROCYTE [DISTWIDTH] IN BLOOD BY AUTOMATED COUNT: 13.4 % (ref 11.5–14.5)
GLOBULIN SER CALC-MCNC: 3 G/DL (ref 2–4)
GLUCOSE SERPL-MCNC: 110 MG/DL (ref 65–100)
HCT VFR BLD AUTO: 32 % (ref 36.6–50.3)
HGB BLD-MCNC: 10.9 G/DL (ref 12.1–17)
IMM GRANULOCYTES # BLD AUTO: 0 K/UL (ref 0–0.04)
IMM GRANULOCYTES NFR BLD AUTO: 0 % (ref 0–0.5)
LYMPHOCYTES # BLD: 1.4 K/UL (ref 0.8–3.5)
LYMPHOCYTES NFR BLD: 23 % (ref 12–49)
MCH RBC QN AUTO: 31.3 PG (ref 26–34)
MCHC RBC AUTO-ENTMCNC: 34.1 G/DL (ref 30–36.5)
MCV RBC AUTO: 92 FL (ref 80–99)
MONOCYTES # BLD: 0.6 K/UL (ref 0–1)
MONOCYTES NFR BLD: 9 % (ref 5–13)
NEUTS SEG # BLD: 3.7 K/UL (ref 1.8–8)
NEUTS SEG NFR BLD: 62 % (ref 32–75)
NRBC # BLD: 0 K/UL (ref 0–0.01)
NRBC BLD-RTO: 0 PER 100 WBC
PLATELET # BLD AUTO: 125 K/UL (ref 150–400)
PMV BLD AUTO: 9.2 FL (ref 8.9–12.9)
POTASSIUM SERPL-SCNC: 5.1 MMOL/L (ref 3.5–5.1)
PROT SERPL-MCNC: 6.8 G/DL (ref 6.4–8.2)
RBC # BLD AUTO: 3.48 M/UL (ref 4.1–5.7)
SODIUM SERPL-SCNC: 138 MMOL/L (ref 136–145)
WBC # BLD AUTO: 6 K/UL (ref 4.1–11.1)

## 2021-04-20 PROCEDURE — 80053 COMPREHEN METABOLIC PANEL: CPT

## 2021-04-20 PROCEDURE — 86334 IMMUNOFIX E-PHORESIS SERUM: CPT

## 2021-04-20 PROCEDURE — 85025 COMPLETE CBC W/AUTO DIFF WBC: CPT

## 2021-04-20 PROCEDURE — 36415 COLL VENOUS BLD VENIPUNCTURE: CPT

## 2021-04-20 PROCEDURE — 84165 PROTEIN E-PHORESIS SERUM: CPT

## 2021-04-20 PROCEDURE — 82232 ASSAY OF BETA-2 PROTEIN: CPT

## 2021-04-23 LAB
ALBUMIN SERPL ELPH-MCNC: 4 G/DL (ref 2.9–4.4)
ALBUMIN/GLOB SERPL: 1.8 {RATIO} (ref 0.7–1.7)
ALPHA1 GLOB SERPL ELPH-MCNC: 0.2 G/DL (ref 0–0.4)
ALPHA2 GLOB SERPL ELPH-MCNC: 0.5 G/DL (ref 0.4–1)
B-GLOBULIN SERPL ELPH-MCNC: 0.7 G/DL (ref 0.7–1.3)
B2 MICROGLOB SERPL-MCNC: 14 MG/L (ref 0.6–2.4)
GAMMA GLOB SERPL ELPH-MCNC: 0.8 G/DL (ref 0.4–1.8)
GLOBULIN SER CALC-MCNC: 2.2 G/DL (ref 2.2–3.9)
M PROTEIN SERPL ELPH-MCNC: 0.2 G/DL
PROT SERPL-MCNC: 6.2 G/DL (ref 6–8.5)

## 2021-04-26 LAB
IGA SERPL-MCNC: 106 MG/DL (ref 61–437)
IGG SERPL-MCNC: 699 MG/DL (ref 603–1613)
IGM SERPL-MCNC: 250 MG/DL (ref 15–143)
PROT PATTERN SERPL IFE-IMP: ABNORMAL

## 2021-05-03 ENCOUNTER — OFFICE VISIT (OUTPATIENT)
Dept: PRIMARY CARE CLINIC | Age: 76
End: 2021-05-03
Payer: MEDICARE

## 2021-05-03 DIAGNOSIS — Z01.812 ENCOUNTER FOR PREPROCEDURE SCREENING LABORATORY TESTING FOR COVID-19: Primary | ICD-10-CM

## 2021-05-03 DIAGNOSIS — Z20.822 ENCOUNTER FOR PREPROCEDURE SCREENING LABORATORY TESTING FOR COVID-19: Primary | ICD-10-CM

## 2021-05-03 PROCEDURE — 99211 OFF/OP EST MAY X REQ PHY/QHP: CPT | Performed by: NURSE PRACTITIONER

## 2021-05-03 NOTE — PROGRESS NOTES
Pt is having a procedure next week. Doctor is requesting a covid test prior to procedure. Pt declined to see a provider.  Verbal consent received to perform test. KATHYA

## 2021-05-04 ENCOUNTER — TELEPHONE (OUTPATIENT)
Dept: FAMILY MEDICINE CLINIC | Age: 76
End: 2021-05-04

## 2021-05-04 NOTE — TELEPHONE ENCOUNTER
Garcia Croft is a 46year old female.   HPI:   Chiquis Khanna is here for evaluation of her thyroid it appears that she had, hashimotos her thyroid antibodies were elevated, and her TSH was slightly low, she was started on levothyroxine and her numbers i Patient came into the COVID testing unit for pre-op colonoscopy testing on 5/3/2021. COVID test was completed and then processed (frozen) in this office. Labs were placed in [facility] box but were not picked up by carrier. Patient called and notified. Dr Mikaela Ivy office at [facility] called at [phone] and notified, spoke to June. Tomorrow's colonscopy has been cancelled and patient has been rescheduled for her colonoscopy on Thursday May 20, 2021 at 1030. Patient notified.  This message routed to PCP and [facility] C/O June  At [phone] shortness of breath with exertion  CARDIOVASCULAR: denies chest pain on exertion  GI: denies abdominal pain and denies heartburn  NEURO: denies headaches  MUSC: muscle pain assoc wth FM  ENDO: thyroid nodules and hypothyroid  EXAM:   /72 mmHg  Pulse

## 2021-08-25 ENCOUNTER — OFFICE VISIT (OUTPATIENT)
Dept: FAMILY MEDICINE CLINIC | Age: 76
End: 2021-08-25
Payer: MEDICARE

## 2021-08-25 VITALS
HEIGHT: 69 IN | DIASTOLIC BLOOD PRESSURE: 70 MMHG | SYSTOLIC BLOOD PRESSURE: 124 MMHG | BODY MASS INDEX: 23.29 KG/M2 | HEART RATE: 78 BPM | WEIGHT: 157.25 LBS | RESPIRATION RATE: 18 BRPM | OXYGEN SATURATION: 98 % | TEMPERATURE: 98.1 F

## 2021-08-25 DIAGNOSIS — I25.10 CORONARY ARTERY DISEASE INVOLVING NATIVE CORONARY ARTERY OF NATIVE HEART WITHOUT ANGINA PECTORIS: ICD-10-CM

## 2021-08-25 DIAGNOSIS — N40.1 BPH ASSOCIATED WITH NOCTURIA: ICD-10-CM

## 2021-08-25 DIAGNOSIS — I12.0 MALIGNANT HYPERTENSIVE KIDNEY DISEASE WITH CHRONIC KIDNEY DISEASE STAGE V OR END STAGE RENAL DISEASE (HCC): ICD-10-CM

## 2021-08-25 DIAGNOSIS — E78.00 HYPERCHOLESTEROLEMIA: ICD-10-CM

## 2021-08-25 DIAGNOSIS — R35.1 BPH ASSOCIATED WITH NOCTURIA: ICD-10-CM

## 2021-08-25 DIAGNOSIS — Z95.5 HISTORY OF CORONARY ARTERY STENT PLACEMENT: ICD-10-CM

## 2021-08-25 DIAGNOSIS — E03.4 HYPOTHYROIDISM DUE TO ACQUIRED ATROPHY OF THYROID: ICD-10-CM

## 2021-08-25 DIAGNOSIS — I10 ESSENTIAL HYPERTENSION: ICD-10-CM

## 2021-08-25 DIAGNOSIS — Z00.00 MEDICARE ANNUAL WELLNESS VISIT, SUBSEQUENT: Primary | ICD-10-CM

## 2021-08-25 PROBLEM — I12.9 MALIGNANT HYPERTENSIVE RENAL DISEASE: Status: ACTIVE | Noted: 2021-08-25

## 2021-08-25 PROCEDURE — G0439 PPPS, SUBSEQ VISIT: HCPCS | Performed by: FAMILY MEDICINE

## 2021-08-25 PROCEDURE — 99214 OFFICE O/P EST MOD 30 MIN: CPT | Performed by: FAMILY MEDICINE

## 2021-08-25 RX ORDER — LEVOTHYROXINE SODIUM 75 UG/1
TABLET ORAL
Qty: 90 TABLET | Refills: 1 | Status: CANCELLED | OUTPATIENT
Start: 2021-08-25

## 2021-08-25 RX ORDER — VIT B COMPLX C/FOLIC ACID/ZINC 0.8MG-15MG
TABLET ORAL
COMMUNITY
Start: 2021-06-02 | End: 2022-04-01

## 2021-08-25 RX ORDER — ZOSTER VACCINE RECOMBINANT, ADJUVANTED 50 MCG/0.5
KIT INTRAMUSCULAR
Qty: 0.5 ML | Refills: 1 | Status: SHIPPED | OUTPATIENT
Start: 2021-08-25

## 2021-08-25 RX ORDER — LOSARTAN POTASSIUM 50 MG/1
50 TABLET ORAL DAILY
Qty: 90 TABLET | Refills: 1
Start: 2021-08-25 | End: 2022-04-01

## 2021-08-25 RX ORDER — CALCITRIOL 0.25 UG/1
0.25 CAPSULE ORAL DAILY
COMMUNITY
Start: 2021-07-05 | End: 2022-04-01

## 2021-08-25 RX ORDER — CLOPIDOGREL BISULFATE 75 MG/1
1 TABLET ORAL
COMMUNITY
Start: 2020-11-04 | End: 2022-04-01

## 2021-08-25 RX ORDER — NIFEDIPINE 60 MG/1
60 TABLET, EXTENDED RELEASE ORAL 2 TIMES DAILY
COMMUNITY
Start: 2021-06-11 | End: 2022-04-01

## 2021-08-25 RX ORDER — NIFEDIPINE 30 MG/1
30 TABLET, FILM COATED, EXTENDED RELEASE ORAL 2 TIMES DAILY
COMMUNITY
Start: 2021-05-23 | End: 2021-08-25 | Stop reason: SDUPTHER

## 2021-08-25 NOTE — PATIENT INSTRUCTIONS
Medicare Wellness Visit, Male    The best way to live healthy is to have a lifestyle where you eat a well-balanced diet, exercise regularly, limit alcohol use, and quit all forms of tobacco/nicotine, if applicable. Regular preventive services are another way to keep healthy. Preventive services (vaccines, screening tests, monitoring & exams) can help personalize your care plan, which helps you manage your own care. Screening tests can find health problems at the earliest stages, when they are easiest to treat. [facility] follows the current, evidence-based guidelines published by the [facility] ([facility]) when recommending preventive services for our patients. Because we follow these guidelines, sometimes recommendations change over time as research supports it. (For example, a prostate screening blood test is no longer routinely recommended for men with no symptoms). Of course, you and your doctor may decide to screen more often for some diseases, based on your risk and co-morbidities (chronic disease you are already diagnosed with). Preventive services for you include:  - Medicare offers their members a free annual wellness visit, which is time for you and your primary care provider to discuss and plan for your preventive service needs. Take advantage of this benefit every year!  -All adults over age 72 should receive the recommended pneumonia vaccines. Current USPSTF guidelines recommend a series of two vaccines for the best pneumonia protection.   -All adults should have a flu vaccine yearly and tetanus vaccine every 10 years.  -All adults age 48 and older should receive the shingles vaccines (series of two vaccines).        -All adults age 38-68 who are overweight should have a diabetes screening test once every three years.   -Other screening tests & preventive services for persons with diabetes include: an eye exam to screen for diabetic retinopathy, a kidney function test, a foot exam, and stricter control over your cholesterol.   -Cardiovascular screening for adults with routine risk involves an electrocardiogram (ECG) at intervals determined by the provider.   -Colorectal cancer screening should be done for adults age 54-65 with no increased risk factors for colorectal cancer. There are a number of acceptable methods of screening for this type of cancer. Each test has its own benefits and drawbacks. Discuss with your provider what is most appropriate for you during your annual wellness visit. The different tests include: colonoscopy (considered the best screening method), a fecal occult blood test, a fecal DNA test, and sigmoidoscopy.  -All adults born between [facility] should be screened once for Hepatitis C.  -An Abdominal Aortic Aneurysm (AAA) Screening is recommended for men age 73-68 who has ever smoked in their lifetime. Here is a list of your current Health Maintenance items (your personalized list of preventive services) with a due date:  Health Maintenance Due   Topic Date Due    Shingles Vaccine (1 of 2) Never done    Cholesterol Test   06/12/2021     Medicare Wellness Visit, Male    The best way to live healthy is to have a lifestyle where you eat a well-balanced diet, exercise regularly, limit alcohol use, and quit all forms of tobacco/nicotine, if applicable. Regular preventive services are another way to keep healthy. Preventive services (vaccines, screening tests, monitoring & exams) can help personalize your care plan, which helps you manage your own care. Screening tests can find health problems at the earliest stages, when they are easiest to treat. [facility] follows the current, evidence-based guidelines published by the [facility] ([facility]) when recommending preventive services for our patients.  Because we follow these guidelines, sometimes recommendations change over time as research supports it. (For example, a prostate screening blood test is no longer routinely recommended for men with no symptoms). Of course, you and your doctor may decide to screen more often for some diseases, based on your risk and co-morbidities (chronic disease you are already diagnosed with). Preventive services for you include:  - Medicare offers their members a free annual wellness visit, which is time for you and your primary care provider to discuss and plan for your preventive service needs. Take advantage of this benefit every year!  -All adults over age 72 should receive the recommended pneumonia vaccines. Current USPSTF guidelines recommend a series of two vaccines for the best pneumonia protection.   -All adults should have a flu vaccine yearly and tetanus vaccine every 10 years.  -All adults age 48 and older should receive the shingles vaccines (series of two vaccines). -All adults age 38-68 who are overweight should have a diabetes screening test once every three years.   -Other screening tests & preventive services for persons with diabetes include: an eye exam to screen for diabetic retinopathy, a kidney function test, a foot exam, and stricter control over your cholesterol.   -Cardiovascular screening for adults with routine risk involves an electrocardiogram (ECG) at intervals determined by the provider.   -Colorectal cancer screening should be done for adults age 54-65 with no increased risk factors for colorectal cancer. There are a number of acceptable methods of screening for this type of cancer. Each test has its own benefits and drawbacks. Discuss with your provider what is most appropriate for you during your annual wellness visit.  The different tests include: colonoscopy (considered the best screening method), a fecal occult blood test, a fecal DNA test, and sigmoidoscopy.  -All adults born between [facility] should be screened once for Hepatitis C.  -An Abdominal Aortic Aneurysm (AAA) Screening is recommended for men age 73-68 who has ever smoked in their lifetime.      Here is a list of your current Health Maintenance items (your personalized list of preventive services) with a due date:  Health Maintenance Due   Topic Date Due    Shingles Vaccine (1 of 2) Never done    Cholesterol Test   06/12/2021

## 2021-08-25 NOTE — PROGRESS NOTES
1. Have you been to the ER, urgent care clinic since your last visit? Hospitalized since your last visit? Yes When: 8-5-21 [facility]    2. Have you seen or consulted any other health care providers outside of the [address] [place] [address] since your last visit? Include any pap smears or colon screening. Yes When: 7-12-21 Dr Andrew Kilpatrick   This is the Subsequent Medicare Annual Wellness Exam, performed 12 months or more after the Initial AWV or the last Subsequent AWV    I have reviewed the patient's medical history in detail and updated the computerized patient record. Rancho Muñoz is a 68 y.o. male who presents with the following:  Chief Complaint   Patient presents with    Annual Wellness Visit    Chronic Kidney Disease    Benign Prostatic Hypertrophy    Heart Problem     Status post stent placement for CAD currently without angina    Thyroid Problem    Cholesterol Problem    Hypertension       Patient's hypothyroidism is being corrected with replacement therapy but the patient has been feeling somewhat fatigued and was wondering if his thyroid needed to be helped by upping the dose. Patient also has chronic kidney disease which is now being treated with home dialysis and the patient does realize that he has to stand up to get the last 600 cc out of his abdomen. I explained to the patient that even peritoneal dialysis takes it out of view and that could make him feel fatigued and tired also. Patient does have BPH and the Cardura that he is taking for his hypertension seems to be helping that somewhat to so he is not having to run to the bathroom as frequently especially at night. The patient does have coronary artery disease and has a stent placed but has not been having any further chest pain but he would like to get off the clopidogrel as soon as possible. I told the patient the cardiologist would determine that.   Patient does have hyper cholesterolemia for which she is taking atorvastatin 80 mg daily without any muscle pain or weakness. Patient's hypertension is currently controlled on his current medications without any chest pain shortness of breath nor edema. Allergies   Allergen Reactions    Pcn [Penicillins] Unknown (comments)       Current Outpatient Medications   Medication Sig    varicella-zoster recombinant, PF, (Shingrix, PF,) 50 mcg/0.5 mL susr injection 0.5mL by IntraMUSCular route once now and then repeat in 2-6 months    calcitRIOL (ROCALTROL) 0.25 mcg capsule Take 0.25 mcg by mouth daily.  clopidogreL (PLAVIX) 75 mg tab Take 1 Tablet by mouth.  NIFEdipine ER (ADALAT CC) 60 mg ER tablet Take 60 mg by mouth two (2) times a day.  Dialyvite 800 with Zinc 15 0.8-15 mg tab TAKE 1 TABLET BY MOUTH ONCE A DAY WITH THE EVENING MEAL    losartan (COZAAR) 50 mg tablet Take 1 Tablet by mouth daily.  furosemide (LASIX) 20 mg tablet Take 80 mg by mouth daily.  atorvastatin (LIPITOR) 80 mg tablet Take 1 Tab by mouth daily.  doxazosin (CARDURA) 2 mg tablet Take 8 mg by mouth daily.  levothyroxine (SYNTHROID) 75 mcg tablet TAKE 1 TABLET BY MOUTH DAILY BEFORE BREAKFAST    aspirin delayed-release 81 mg tablet Take 1 Tab by mouth daily. No current facility-administered medications for this visit.        Past Medical History:   Diagnosis Date    CKD stage G3b/A1, GFR 30-44 and albumin creatinine ratio <30 mg/g (HCC)     History of hepatitis A     Hypercholesterolemia     Hypertension     Polio     AGE 7       Past Surgical History:   Procedure Laterality Date    HX CATARACT REMOVAL         Family History   Problem Relation Age of Onset    Hypertension Father     Cancer Father         woodsman cancer    Heart Disease Maternal Grandfather     Dementia Mother     Macular Degen Mother     Cancer Sister         breast       Social History     Socioeconomic History    Marital status:      Spouse name: Not on file    Number of children: Not on file    Years of education: Not on file    Highest education level: Not on file   Tobacco Use    Smoking status: Former Smoker    Smokeless tobacco: Never Used   Substance and Sexual Activity    Alcohol use: Yes     Alcohol/week: 0.0 standard drinks    Drug use: No     Social Determinants of Health     Financial Resource Strain:     Difficulty of Paying Living Expenses:    Food Insecurity:     Worried About Running Out of Food in the Last Year:     [address] in the Last Year:    Transportation Needs:     Lack of Transportation (Medical):  Lack of Transportation (Non-Medical):    Physical Activity:     Days of Exercise per Week:     Minutes of Exercise per Session:    Stress:     Feeling of Stress :    Social Connections:     Frequency of Communication with Friends and Family:     Frequency of Social Gatherings with Friends and Family:     Attends Congregation Services:     Active Member of Clubs or Organizations:     Attends Club or Organization Meetings:     Marital Status:        Review of Systems   Constitutional: Negative for chills, fever, malaise/fatigue and weight loss. HENT: Negative for congestion, hearing loss, sore throat and tinnitus. Eyes: Negative for blurred vision, pain and discharge. Respiratory: Negative for cough, shortness of breath and wheezing. Cardiovascular: Negative for chest pain, palpitations, orthopnea, claudication and leg swelling. Gastrointestinal: Negative for abdominal pain, constipation and heartburn. Genitourinary: Negative for dysuria, frequency and urgency. Musculoskeletal: Negative for falls, joint pain and myalgias. Skin: Negative for itching and rash. Neurological: Negative for dizziness, tingling, tremors and headaches. Endo/Heme/Allergies: Negative for environmental allergies and polydipsia. Psychiatric/Behavioral: Negative for depression and substance abuse. The patient is not nervous/anxious.         Visit Vitals  /70 (BP 1 Location: Left upper arm)   Pulse 78   Temp 98.1 °F (36.7 °C) (Temporal)   Resp 18   Ht 5' 9\" (1.753 m)   Wt 157 lb 4 oz (71.3 kg)   SpO2 98%   BMI 23.22 kg/m²     Physical Exam  Vitals reviewed. Constitutional:       Appearance: Normal appearance. HENT:      Head: Normocephalic and atraumatic. Right Ear: Tympanic membrane, ear canal and external ear normal.      Left Ear: Tympanic membrane, ear canal and external ear normal.      Nose: Nose normal. No congestion or rhinorrhea. Mouth/Throat:      Mouth: Mucous membranes are moist.      Pharynx: No oropharyngeal exudate or posterior oropharyngeal erythema. Eyes:      Extraocular Movements: Extraocular movements intact. Conjunctiva/sclera: Conjunctivae normal.      Pupils: Pupils are equal, round, and reactive to light. Comments: Pupil iris and anterior chamber normal.   Neck:      Trachea: No tracheal deviation. Cardiovascular:      Rate and Rhythm: Normal rate and regular rhythm. Pulses: Normal pulses. Heart sounds: Normal heart sounds. No murmur heard. No friction rub. No gallop. Pulmonary:      Effort: Pulmonary effort is normal. No respiratory distress. Breath sounds: Normal breath sounds. No wheezing, rhonchi or rales. Chest:      Chest wall: No tenderness. Abdominal:      General: Bowel sounds are normal. There is no distension. Palpations: Abdomen is soft. There is no mass. Tenderness: There is no abdominal tenderness. There is no guarding or rebound. Hernia: No hernia is present. Comments: Peritoneal dialysis catheter   Musculoskeletal:         General: No tenderness. Normal range of motion. Cervical back: Normal range of motion and neck supple. Right lower leg: No edema. Left lower leg: No edema. Lymphadenopathy:      Cervical: No cervical adenopathy. Skin:     General: Skin is warm and dry. Findings: No erythema or rash.    Neurological:      General: No focal deficit present. Mental Status: He is alert and oriented to person, place, and time. Cranial Nerves: No cranial nerve deficit. Motor: No abnormal muscle tone. Deep Tendon Reflexes: Reflexes are normal and symmetric. Reflexes normal.      Comments: Cranial nerves II through XII intact sensory and motor. Deep tendon reflexes in the biceps triceps knee and ankle are normal and bilaterally symmetrical.   Psychiatric:         Mood and Affect: Mood normal.         Behavior: Behavior normal.         Thought Content: Thought content normal.         Judgment: Judgment normal.           ICD-10-CM ICD-9-CM    1. Medicare annual wellness visit, subsequent  Z00.00 V70.0    2. Hypothyroidism due to acquired atrophy of thyroid  E03.4 244.8 TSH 3RD GENERATION     246.8 TSH 3RD GENERATION   3. Essential hypertension  I10 401.9 losartan (COZAAR) 50 mg tablet      METABOLIC PANEL, COMPREHENSIVE      CBC WITH AUTOMATED DIFF      CBC WITH AUTOMATED DIFF      METABOLIC PANEL, COMPREHENSIVE      COLLECTION VENOUS BLOOD,VENIPUNCTURE   4. 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 Coronary artery disease involving native coronary artery of native heart without angina pectoris  I25.10 414.01    6. Hypercholesterolemia  E78.00 272.0    7. History of coronary artery stent placement  Z95.5 V45.82    8.  BPH associated with nocturia  N40.1 600.01     R35.1 788.43        Orders Placed This Encounter    COLLECTION VENOUS BLOOD,VENIPUNCTURE    TSH 3RD GENERATION     Standing Status:   Future     Number of Occurrences:   1     Standing Expiration Date:   5/00/2210    METABOLIC PANEL, COMPREHENSIVE     Standing Status:   Future     Number of Occurrences:   1     Standing Expiration Date:   8/25/2022    CBC WITH AUTOMATED DIFF     Standing Status:   Future     Number of Occurrences:   1     Standing Expiration Date:   8/25/2022    varicella-zoster recombinant, PF, (Shingrix, PF,) 50 mcg/0.5 mL susr injection     Si.5mL by IntraMUSCular route once now and then repeat in 2-6 months     Dispense:  0.5 mL     Refill:  1    calcitRIOL (ROCALTROL) 0.25 mcg capsule     Sig: Take 0.25 mcg by mouth daily.  clopidogreL (PLAVIX) 75 mg tab     Sig: Take 1 Tablet by mouth.  DISCONTD: NIFEdipine ER (ADALAT CC) 30 mg ER tablet     Sig: Take 30 mg by mouth two (2) times a day.  NIFEdipine ER (ADALAT CC) 60 mg ER tablet     Sig: Take 60 mg by mouth two (2) times a day.  Dialyvite 800 with Zinc 15 0.8-15 mg tab     Sig: TAKE 1 TABLET BY MOUTH ONCE A DAY WITH THE EVENING MEAL    losartan (COZAAR) 50 mg tablet     Sig: Take 1 Tablet by mouth daily. Dispense:  90 Tablet     Refill:  1       Follow-up and Dispositions    · Return in about 6 months (around 2022), or if symptoms worsen or fail to improve. Paula Fabian MD          Assessment/Plan   Education and counseling provided:  Are appropriate based on today's review and evaluation  End-of-Life planning (with patient's consent)    1. Medicare annual wellness visit, subsequent  2. Hypothyroidism due to acquired atrophy of thyroid  -     TSH 3RD GENERATION; Future  3. Essential hypertension  -     losartan (COZAAR) 50 mg tablet; Take 1 Tablet by mouth daily. , No Print, Disp-90 Tablet, R-1  -     METABOLIC PANEL, COMPREHENSIVE; Future  -     CBC WITH AUTOMATED DIFF; Future  -     COLLECTION VENOUS BLOOD,VENIPUNCTURE  4. 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 Coronary artery disease involving native coronary artery of native heart without angina pectoris  6. Hypercholesterolemia  7. History of coronary artery stent placement  8.  BPH associated with nocturia       Depression Risk Factor Screening     3 most recent PHQ Screens 2021   PHQ Not Done -   Little interest or pleasure in doing things Not at all   Feeling down, depressed, irritable, or hopeless Not at all   Total Score PHQ 2 0       Alcohol Risk Screen    Do you average more than 1 drink per night or more than 7 drinks a week: No    In the past three months have you have had more than 4 drinks containing alcohol on one occasion: No        Functional Ability and Level of Safety    Hearing: Hearing is good. Activities of Daily Living: The home contains: no safety equipment. Patient does total self care       Functional Ability:   Does the patient exhibit a steady gait? yes   How long did it take the patient to get up and walk from a sitting position? 3sec   Is the patient self reliant?  (ie can do own laundry, meals, household chores)  yes     Does the patient handle his/her own medications? yes     Does the patient handle his/her own money? yes     Is the patients home safe (ie good lighting, handrails on stairs and bath, etc.)? yes     Did you notice or did patient express any hearing difficulties? no     Did you notice or did patient express any vision difficulties?   no     Were distance and reading eye charts used? no       Advance Care Planning:   Patient was offered the opportunity to discuss advance care planning:  yes     Does patient have an Advance Directive:  yes   If no, did you provide information on Caring Connections?   no     ADL Assessment 8/25/2021   Feeding yourself No Help Needed   Getting from bed to chair No Help Needed   Getting dressed No Help Needed   Bathing or showering No Help Needed   Walk across the room (includes cane/walker) No Help Needed   Using the telphone No Help Needed   Taking your medications No Help Needed   Preparing meals No Help Needed   Managing money (expenses/bills) No Help Needed   Moderately strenuous housework (laundry) No Help Needed   Shopping for personal items (toiletries/medicines) No Help Needed   Shopping for groceries No Help Needed   Driving No Help Needed   Climbing a flight of stairs No Help Needed Getting to places beyond walking distances No Help Needed       Abuse Screening Questionnaire 8/25/2021   Do you ever feel afraid of your partner? N   Are you in a relationship with someone who physically or mentally threatens you? N   Is it safe for you to go home? Y        Ambulation: with no difficulty     Fall Risk:  Fall Risk Assessment, last 12 mths 8/25/2021   Able to walk? Yes   Fall in past 12 months? 1   Do you feel unsteady? 0   Are you worried about falling 0   Is TUG test greater than 12 seconds? 0   Is the gait abnormal? 0   Number of falls in past 12 months -   Fall with injury?  0      Abuse Screen:  Patient is not abused       Cognitive Screening    Has your family/caregiver stated any concerns about your memory: no     Cognitive Screening: Normal - Verbal Fluency Test, oriented x4    Health Maintenance Due     Health Maintenance Due   Topic Date Due    Shingrix Vaccine Age 49> (1 of 2) Never done    Lipid Screen  06/12/2021       Patient Care Team   Patient Care Team:  Bette Chatman MD as PCP - General (Family Medicine)  Bette Chatman [facility] as PCP [facility]    History     Patient Active Problem List   Diagnosis Code    Hypercholesterolemia E78.00    Essential hypertension I10    BPH associated with nocturia N40.1, R35.1    Hypothyroidism due to acquired atrophy of thyroid E03.4    History of coronary artery stent placement Z95.5    Coronary artery disease involving native coronary artery of native heart without angina pectoris I25.10    Malignant hypertensive renal disease I12.9     Past Medical History:   Diagnosis Date    CKD stage G3b/A1, GFR 30-44 and albumin creatinine ratio <30 mg/g (HCC)     History of hepatitis A     Hypercholesterolemia     Hypertension     Polio     AGE 7      Past Surgical History:   Procedure Laterality Date    HX CATARACT REMOVAL       Current Outpatient Medications   Medication Sig Dispense Refill    varicella-zoster recombinant, PF, (Shingrix, PF,) 50 mcg/0.5 mL susr injection 0.5mL by IntraMUSCular route once now and then repeat in 2-6 months 0.5 mL 1    calcitRIOL (ROCALTROL) 0.25 mcg capsule Take 0.25 mcg by mouth daily.  clopidogreL (PLAVIX) 75 mg tab Take 1 Tablet by mouth.  NIFEdipine ER (ADALAT CC) 60 mg ER tablet Take 60 mg by mouth two (2) times a day.  Dialyvite 800 with Zinc 15 0.8-15 mg tab TAKE 1 TABLET BY MOUTH ONCE A DAY WITH THE EVENING MEAL      losartan (COZAAR) 50 mg tablet Take 1 Tablet by mouth daily. 90 Tablet 1    furosemide (LASIX) 20 mg tablet Take 80 mg by mouth daily.  atorvastatin (LIPITOR) 80 mg tablet Take 1 Tab by mouth daily.  doxazosin (CARDURA) 2 mg tablet Take 8 mg by mouth daily.  levothyroxine (SYNTHROID) 75 mcg tablet TAKE 1 TABLET BY MOUTH DAILY BEFORE BREAKFAST 90 Tab 1    aspirin delayed-release 81 mg tablet Take 1 Tab by mouth daily. 90 Tab 3     Allergies   Allergen Reactions    Pcn [Penicillins] Unknown (comments)       Family History   Problem Relation Age of Onset    Hypertension Father     Cancer Father         woodsman cancer    Heart Disease Maternal Grandfather     Dementia Mother     Macular Degen Mother     Cancer Sister         breast     Social History     Tobacco Use    Smoking status: Former Smoker    Smokeless tobacco: Never Used   Substance Use Topics    Alcohol use:  Yes     Alcohol/week: 0.0 standard drinks         JOSE Reveles MD

## 2021-08-26 LAB
ALBUMIN SERPL-MCNC: 3.4 G/DL (ref 3.5–5)
ALBUMIN/GLOB SERPL: 1.2 {RATIO} (ref 1.1–2.2)
ALP SERPL-CCNC: 71 U/L (ref 45–117)
ALT SERPL-CCNC: 34 U/L (ref 12–78)
ANION GAP SERPL CALC-SCNC: 14 MMOL/L (ref 5–15)
AST SERPL-CCNC: 30 U/L (ref 15–37)
BASOPHILS # BLD: 0.1 K/UL (ref 0–0.1)
BASOPHILS NFR BLD: 1 % (ref 0–1)
BILIRUB SERPL-MCNC: 0.3 MG/DL (ref 0.2–1)
BUN SERPL-MCNC: 89 MG/DL (ref 6–20)
BUN/CREAT SERPL: 7 (ref 12–20)
CALCIUM SERPL-MCNC: 8.4 MG/DL (ref 8.5–10.1)
CHLORIDE SERPL-SCNC: 94 MMOL/L (ref 97–108)
CO2 SERPL-SCNC: 25 MMOL/L (ref 21–32)
CREAT SERPL-MCNC: 12.8 MG/DL (ref 0.7–1.3)
DIFFERENTIAL METHOD BLD: ABNORMAL
EOSINOPHIL # BLD: 0.3 K/UL (ref 0–0.4)
EOSINOPHIL NFR BLD: 3 % (ref 0–7)
ERYTHROCYTE [DISTWIDTH] IN BLOOD BY AUTOMATED COUNT: 13.9 % (ref 11.5–14.5)
GLOBULIN SER CALC-MCNC: 2.9 G/DL (ref 2–4)
GLUCOSE SERPL-MCNC: 96 MG/DL (ref 65–100)
HCT VFR BLD AUTO: 42.9 % (ref 36.6–50.3)
HGB BLD-MCNC: 14.7 G/DL (ref 12.1–17)
IMM GRANULOCYTES # BLD AUTO: 0 K/UL (ref 0–0.04)
IMM GRANULOCYTES NFR BLD AUTO: 1 % (ref 0–0.5)
LYMPHOCYTES # BLD: 1.7 K/UL (ref 0.8–3.5)
LYMPHOCYTES NFR BLD: 22 % (ref 12–49)
MCH RBC QN AUTO: 30.6 PG (ref 26–34)
MCHC RBC AUTO-ENTMCNC: 34.3 G/DL (ref 30–36.5)
MCV RBC AUTO: 89.4 FL (ref 80–99)
MONOCYTES # BLD: 0.8 K/UL (ref 0–1)
MONOCYTES NFR BLD: 11 % (ref 5–13)
NEUTS SEG # BLD: 4.8 K/UL (ref 1.8–8)
NEUTS SEG NFR BLD: 62 % (ref 32–75)
NRBC # BLD: 0 K/UL (ref 0–0.01)
NRBC BLD-RTO: 0 PER 100 WBC
PLATELET # BLD AUTO: 142 K/UL (ref 150–400)
PMV BLD AUTO: 9.5 FL (ref 8.9–12.9)
POTASSIUM SERPL-SCNC: 5.2 MMOL/L (ref 3.5–5.1)
PROT SERPL-MCNC: 6.3 G/DL (ref 6.4–8.2)
RBC # BLD AUTO: 4.8 M/UL (ref 4.1–5.7)
SODIUM SERPL-SCNC: 133 MMOL/L (ref 136–145)
TSH SERPL DL<=0.05 MIU/L-ACNC: 2.87 UIU/ML (ref 0.36–3.74)
WBC # BLD AUTO: 7.6 K/UL (ref 4.1–11.1)

## 2021-11-11 RX ORDER — ATORVASTATIN CALCIUM 80 MG/1
80 TABLET, FILM COATED ORAL DAILY
Qty: 30 TABLET | Refills: 2 | Status: SHIPPED | OUTPATIENT
Start: 2021-11-11 | End: 2022-04-01 | Stop reason: SDUPTHER

## 2022-03-18 PROBLEM — E03.4 HYPOTHYROIDISM DUE TO ACQUIRED ATROPHY OF THYROID: Status: ACTIVE | Noted: 2017-09-12

## 2022-03-19 PROBLEM — R35.1 BPH ASSOCIATED WITH NOCTURIA: Status: ACTIVE | Noted: 2017-08-01

## 2022-03-19 PROBLEM — Z95.5 HISTORY OF CORONARY ARTERY STENT PLACEMENT: Status: ACTIVE | Noted: 2020-11-18

## 2022-03-19 PROBLEM — I25.10 CORONARY ARTERY DISEASE INVOLVING NATIVE CORONARY ARTERY OF NATIVE HEART WITHOUT ANGINA PECTORIS: Status: ACTIVE | Noted: 2020-11-18

## 2022-03-19 PROBLEM — I12.9 MALIGNANT HYPERTENSIVE RENAL DISEASE: Status: ACTIVE | Noted: 2021-08-25

## 2022-03-19 PROBLEM — N40.1 BPH ASSOCIATED WITH NOCTURIA: Status: ACTIVE | Noted: 2017-08-01

## 2022-04-01 ENCOUNTER — OFFICE VISIT (OUTPATIENT)
Dept: FAMILY MEDICINE CLINIC | Age: 77
End: 2022-04-01
Payer: MEDICARE

## 2022-04-01 VITALS
SYSTOLIC BLOOD PRESSURE: 122 MMHG | HEART RATE: 69 BPM | BODY MASS INDEX: 24.14 KG/M2 | TEMPERATURE: 97.9 F | OXYGEN SATURATION: 99 % | WEIGHT: 163 LBS | RESPIRATION RATE: 16 BRPM | DIASTOLIC BLOOD PRESSURE: 62 MMHG | HEIGHT: 69 IN

## 2022-04-01 DIAGNOSIS — E78.00 HYPERCHOLESTEROLEMIA: ICD-10-CM

## 2022-04-01 DIAGNOSIS — Z94.0 RENAL TRANSPLANT, STATUS POST: ICD-10-CM

## 2022-04-01 DIAGNOSIS — E03.4 HYPOTHYROIDISM DUE TO ACQUIRED ATROPHY OF THYROID: Primary | ICD-10-CM

## 2022-04-01 DIAGNOSIS — I10 ESSENTIAL HYPERTENSION: ICD-10-CM

## 2022-04-01 PROBLEM — I12.9 MALIGNANT HYPERTENSIVE RENAL DISEASE: Status: RESOLVED | Noted: 2021-08-25 | Resolved: 2022-04-01

## 2022-04-01 PROCEDURE — 99214 OFFICE O/P EST MOD 30 MIN: CPT | Performed by: FAMILY MEDICINE

## 2022-04-01 RX ORDER — VALGANCICLOVIR 450 MG/1
450 TABLET, FILM COATED ORAL DAILY
COMMUNITY

## 2022-04-01 RX ORDER — PREDNISONE 5 MG/1
5 TABLET ORAL DAILY
COMMUNITY

## 2022-04-01 RX ORDER — ATORVASTATIN CALCIUM 80 MG/1
80 TABLET, FILM COATED ORAL DAILY
Qty: 90 TABLET | Refills: 1 | Status: SHIPPED | OUTPATIENT
Start: 2022-04-01

## 2022-04-01 RX ORDER — FAMOTIDINE 20 MG/1
20 TABLET, FILM COATED ORAL 2 TIMES DAILY
COMMUNITY

## 2022-04-01 RX ORDER — CARVEDILOL 25 MG/1
25 TABLET ORAL 2 TIMES DAILY WITH MEALS
COMMUNITY

## 2022-04-01 RX ORDER — SULFAMETHOXAZOLE AND TRIMETHOPRIM 400; 80 MG/1; MG/1
1 TABLET ORAL
COMMUNITY
Start: 2022-03-24 | End: 2022-07-22

## 2022-04-01 RX ORDER — LEVOTHYROXINE SODIUM 75 UG/1
TABLET ORAL
Qty: 90 TABLET | Refills: 1 | Status: SHIPPED | OUTPATIENT
Start: 2022-04-01 | End: 2022-09-21 | Stop reason: SDUPTHER

## 2022-04-01 RX ORDER — SENNOSIDES 8.6 MG/1
1 CAPSULE, GELATIN COATED ORAL
COMMUNITY

## 2022-04-01 RX ORDER — TAMSULOSIN HYDROCHLORIDE 0.4 MG/1
0.4 CAPSULE ORAL DAILY
COMMUNITY

## 2022-04-01 RX ORDER — TACROLIMUS 0.5 MG/1
0.5 CAPSULE ORAL ONCE
COMMUNITY

## 2022-04-01 RX ORDER — LANOLIN ALCOHOL/MO/W.PET/CERES
400 CREAM (GRAM) TOPICAL DAILY
COMMUNITY

## 2022-04-01 NOTE — PROGRESS NOTES
Jose G Nicole is a 68 y.o. male who presents with the following:  Chief Complaint   Patient presents with    Kidney/liver Recipient Follow-up     kidney    Cholesterol Problem    Thyroid Problem       The patient had a kidney transplant in late January and has been recovering nicely and is still being followed closely by the transplant staff at [facility]. 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 Patient notes no cold intolerance nor has he had any excessive fatigue. Patient also needs a refill of his atorvastatin for his hyperlipidemia and he has been tolerating the medicine without any muscle pain or weakness. Patient has noted that his blood pressure has been somewhat labile and indeed today it seemed to be the case but after he had rested for a while it came down to 122/62. Allergies   Allergen Reactions    Pcn [Penicillins] Unknown (comments)       Current Outpatient Medications   Medication Sig    atorvastatin (LIPITOR) 80 mg tablet Take 1 Tablet by mouth daily.  levothyroxine (SYNTHROID) 75 mcg tablet TAKE 1 TABLET BY MOUTH EVERY DAY ON AN EMPTY STOMACH    varicella-zoster recombinant, PF, (Shingrix, PF,) 50 mcg/0.5 mL susr injection 0.5mL by IntraMUSCular route once now and then repeat in 2-6 months    aspirin delayed-release 81 mg tablet Take 1 Tab by mouth daily. No current facility-administered medications for this visit.        Past Medical History:   Diagnosis Date    CKD stage G3b/A1, GFR 30-44 and albumin creatinine ratio <30 mg/g (HCC)     History of hepatitis A     Hypercholesterolemia     Hypertension     Malignant hypertensive renal disease 8/25/2021    Polio     AGE 7       Past Surgical History:   Procedure Laterality Date    HX CATARACT REMOVAL         Family History   Problem Relation Age of Onset    Hypertension Father     Cancer Father         [facility] cancer    Heart Disease Maternal Grandfather     Dementia Mother     Macular Degen Mother     Cancer Sister         breast       Social History     Socioeconomic History    Marital status:    Tobacco Use    Smoking status: Former Smoker    Smokeless tobacco: Never Used   Vaping Use    Vaping Use: Never used   Substance and Sexual Activity    Alcohol use: Yes     Alcohol/week: 0.0 standard drinks    Drug use: No       Review of Systems   Constitutional: Negative for chills, fever, malaise/fatigue and weight loss. HENT: Negative for congestion, hearing loss, sore throat and tinnitus. Eyes: Negative for blurred vision, pain and discharge. Respiratory: Negative for cough, shortness of breath and wheezing. Cardiovascular: Negative for chest pain, palpitations, orthopnea, claudication and leg swelling. Gastrointestinal: Negative for abdominal pain, constipation and heartburn. Genitourinary: Negative for dysuria, frequency and urgency. Surgical wounds from these transplantation are healing nicely and he still has some clips in place and he anticipates these staples being removed in the near future. Musculoskeletal: Negative for falls, joint pain and myalgias. Skin: Negative for itching and rash. Neurological: Negative for dizziness, tingling, tremors and headaches. Endo/Heme/Allergies: Negative for environmental allergies and polydipsia. Psychiatric/Behavioral: Negative for depression and substance abuse. The patient is not nervous/anxious. Visit Vitals  /62   Pulse 69   Temp 97.9 °F (36.6 °C) (Temporal)   Resp 16   Ht 5' 9\" (1.753 m)   Wt 163 lb (73.9 kg)   SpO2 99%   BMI 24.07 kg/m²     Physical Exam  Vitals reviewed. Constitutional:       General: He is not in acute distress. Appearance: Normal appearance. He is normal weight. He is not ill-appearing. HENT:      Head: Normocephalic and atraumatic.       Right Ear: Tympanic membrane, ear canal and external ear normal.      Left Ear: Tympanic membrane, ear canal and external ear normal.      Nose: Nose normal. No congestion or rhinorrhea. Mouth/Throat:      Mouth: Mucous membranes are moist.      Pharynx: No oropharyngeal exudate or posterior oropharyngeal erythema. Eyes:      Extraocular Movements: Extraocular movements intact. Conjunctiva/sclera: Conjunctivae normal.      Pupils: Pupils are equal, round, and reactive to light. Comments: Pupil iris and anterior chamber normal.   Neck:      Trachea: No tracheal deviation. Cardiovascular:      Rate and Rhythm: Normal rate and regular rhythm. Pulses: Normal pulses. Heart sounds: Normal heart sounds. No murmur heard. No friction rub. No gallop. Pulmonary:      Effort: Pulmonary effort is normal. No respiratory distress. Breath sounds: Normal breath sounds. No wheezing, rhonchi or rales. Chest:      Chest wall: No tenderness. Abdominal:      General: Bowel sounds are normal. There is no distension. Palpations: Abdomen is soft. There is no mass. Tenderness: There is no abdominal tenderness. There is no guarding or rebound. Hernia: No hernia is present. Comments: Abdominal wounds are healing nicely there are a few staples still remaining. There is no abnormal drainage. Musculoskeletal:         General: No tenderness. Normal range of motion. Cervical back: Normal range of motion and neck supple. Lymphadenopathy:      Cervical: No cervical adenopathy. Skin:     General: Skin is warm and dry. Findings: No erythema or rash. Neurological:      General: No focal deficit present. Mental Status: He is alert and oriented to person, place, and time. Cranial Nerves: No cranial nerve deficit. Motor: No abnormal muscle tone. Deep Tendon Reflexes: Reflexes are normal and symmetric. Reflexes normal.      Comments: Cranial nerves II through XII intact sensory and motor.   Deep tendon reflexes in the biceps triceps knee and ankle are normal and bilaterally symmetrical.   Psychiatric:         Mood and Affect: Mood normal.         Behavior: Behavior normal.         Thought Content: Thought content normal.         Judgment: Judgment normal.           ICD-10-CM ICD-9-CM    1. Hypothyroidism due to acquired atrophy of thyroid  E03.4 244.8 TSH 3RD GENERATION     246.8 levothyroxine (SYNTHROID) 75 mcg tablet   2. Essential hypertension  I10 401.9    3. Hypercholesterolemia  E78.00 272.0 atorvastatin (LIPITOR) 80 mg tablet   4. Renal transplant, status post  Z94.0 V42.0        Orders Placed This Encounter    TSH 3RD GENERATION     Standing Status:   Future     Standing Expiration Date:   4/1/2023    atorvastatin (LIPITOR) 80 mg tablet     Sig: Take 1 Tablet by mouth daily. Dispense:  90 Tablet     Refill:  1    levothyroxine (SYNTHROID) 75 mcg tablet     Sig: TAKE 1 TABLET BY MOUTH EVERY DAY ON AN EMPTY STOMACH     Dispense:  90 Tablet     Refill:  1       Follow-up and Dispositions    · Return in about 6 months (around 10/1/2022), or if symptoms worsen or fail to improve.          Michael Rios MD

## 2022-04-01 NOTE — PROGRESS NOTES
Chief Complaint   Patient presents with    Kidney/liver Recipient Follow-up     kidney       1. \"Have you been to the ER, urgent care clinic since your last visit? Hospitalized since your last visit? \" No    2. \"Have you seen or consulted any other health care providers outside of the [address] [place] [address] since your last visit? \" No     3. For patients aged 39-70: Has the patient had a colonoscopy / FIT/ Cologuard? No - AGE      If the patient is female:    4. For patients aged 41-77: Has the patient had a mammogram within the past 2 years? No -AGE      5. For patients aged 21-65: Has the patient had a pap smear? NA - based on age or sex      Identified pt with two pt identifiers(name and ). Reviewed record in preparation for visit and have obtained necessary documentation.     Symptom review:    NO  Fever   NO  Shaking chills  NO  Cough  NO Headaches  NO  Body aches  NO  Coughing up blood  NO  Chest congestion  NO  Chest pain  NO  Shortness of breath  NO  Profound Loss of smell/taste  NO  Nausea/Vomiting   NO  Loose stool/Diarrhea  NO  any skin issues

## 2022-04-02 LAB — TSH SERPL DL<=0.05 MIU/L-ACNC: 2.4 UIU/ML (ref 0.36–3.74)

## 2022-04-18 ENCOUNTER — HOSPITAL ENCOUNTER (OUTPATIENT)
Dept: GENERAL RADIOLOGY | Age: 77
Discharge: HOME | End: 2022-04-18
Payer: MEDICARE

## 2022-04-18 ENCOUNTER — TRANSCRIBE ORDER (OUTPATIENT)
Dept: SCHEDULING | Age: 77
End: 2022-04-18

## 2022-04-18 DIAGNOSIS — D47.2 MGUS (MONOCLONAL GAMMOPATHY OF UNKNOWN SIGNIFICANCE): Primary | ICD-10-CM

## 2022-04-18 DIAGNOSIS — D47.2 MGUS (MONOCLONAL GAMMOPATHY OF UNKNOWN SIGNIFICANCE): ICD-10-CM

## 2022-04-18 PROCEDURE — 77075 RADEX OSSEOUS SURVEY COMPL: CPT

## 2022-09-21 DIAGNOSIS — E03.4 HYPOTHYROIDISM DUE TO ACQUIRED ATROPHY OF THYROID: ICD-10-CM

## 2022-09-21 RX ORDER — LEVOTHYROXINE SODIUM 75 UG/1
TABLET ORAL
Qty: 90 TABLET | Refills: 1 | Status: SHIPPED | OUTPATIENT
Start: 2022-09-21

## 2023-04-15 DIAGNOSIS — E03.4 HYPOTHYROIDISM DUE TO ACQUIRED ATROPHY OF THYROID: ICD-10-CM

## 2023-04-17 RX ORDER — LEVOTHYROXINE SODIUM 75 UG/1
TABLET ORAL
Qty: 90 TABLET | Refills: 1 | Status: SHIPPED | OUTPATIENT
Start: 2023-04-17

## 2023-05-16 RX ORDER — CARVEDILOL 25 MG/1
25 TABLET ORAL 2 TIMES DAILY WITH MEALS
COMMUNITY

## 2023-05-16 RX ORDER — ZOSTER VACCINE RECOMBINANT, ADJUVANTED 50 MCG/0.5
KIT INTRAMUSCULAR
COMMUNITY
Start: 2021-08-25

## 2023-05-16 RX ORDER — LEVOTHYROXINE SODIUM 0.07 MG/1
TABLET ORAL
COMMUNITY
Start: 2023-04-17

## 2023-05-16 RX ORDER — ASPIRIN 81 MG/1
81 TABLET ORAL DAILY
COMMUNITY
Start: 2013-09-25

## 2023-05-16 RX ORDER — PREDNISONE 5 MG/1
5 TABLET ORAL DAILY
COMMUNITY

## 2023-05-16 RX ORDER — ATORVASTATIN CALCIUM 80 MG/1
TABLET, FILM COATED ORAL
Qty: 90 TABLET | Refills: 1 | Status: SHIPPED | OUTPATIENT
Start: 2023-05-16

## 2023-05-16 RX ORDER — ATORVASTATIN CALCIUM 80 MG/1
80 TABLET, FILM COATED ORAL DAILY
COMMUNITY
Start: 2022-04-01

## 2023-05-16 RX ORDER — TAMSULOSIN HYDROCHLORIDE 0.4 MG/1
0.4 CAPSULE ORAL DAILY
COMMUNITY

## 2023-05-16 RX ORDER — VALGANCICLOVIR 450 MG/1
450 TABLET, FILM COATED ORAL DAILY
COMMUNITY

## 2023-05-16 RX ORDER — TACROLIMUS 0.5 MG/1
0.5 CAPSULE ORAL ONCE
COMMUNITY

## 2023-05-16 RX ORDER — MAGNESIUM OXIDE 400 MG/1
400 TABLET ORAL DAILY
COMMUNITY

## 2023-05-16 RX ORDER — FAMOTIDINE 20 MG/1
20 TABLET, FILM COATED ORAL 2 TIMES DAILY
COMMUNITY

## 2023-10-11 SDOH — ECONOMIC STABILITY: FOOD INSECURITY: WITHIN THE PAST 12 MONTHS, YOU WORRIED THAT YOUR FOOD WOULD RUN OUT BEFORE YOU GOT MONEY TO BUY MORE.: NEVER TRUE

## 2023-10-11 SDOH — ECONOMIC STABILITY: INCOME INSECURITY: HOW HARD IS IT FOR YOU TO PAY FOR THE VERY BASICS LIKE FOOD, HOUSING, MEDICAL CARE, AND HEATING?: NOT HARD AT ALL

## 2023-10-11 SDOH — ECONOMIC STABILITY: HOUSING INSECURITY
IN THE LAST 12 MONTHS, WAS THERE A TIME WHEN YOU DID NOT HAVE A STEADY PLACE TO SLEEP OR SLEPT IN A SHELTER (INCLUDING NOW)?: NO

## 2023-10-11 SDOH — ECONOMIC STABILITY: FOOD INSECURITY: WITHIN THE PAST 12 MONTHS, THE FOOD YOU BOUGHT JUST DIDN'T LAST AND YOU DIDN'T HAVE MONEY TO GET MORE.: NEVER TRUE

## 2023-10-11 SDOH — ECONOMIC STABILITY: TRANSPORTATION INSECURITY
IN THE PAST 12 MONTHS, HAS LACK OF TRANSPORTATION KEPT YOU FROM MEETINGS, WORK, OR FROM GETTING THINGS NEEDED FOR DAILY LIVING?: NO

## 2023-10-12 ENCOUNTER — OFFICE VISIT (OUTPATIENT)
Age: 78
End: 2023-10-12

## 2023-10-12 VITALS
BODY MASS INDEX: 25.03 KG/M2 | HEIGHT: 69 IN | HEART RATE: 57 BPM | OXYGEN SATURATION: 97 % | SYSTOLIC BLOOD PRESSURE: 138 MMHG | WEIGHT: 169 LBS | DIASTOLIC BLOOD PRESSURE: 71 MMHG | RESPIRATION RATE: 18 BRPM

## 2023-10-12 DIAGNOSIS — I10 ESSENTIAL HYPERTENSION: ICD-10-CM

## 2023-10-12 DIAGNOSIS — N52.02 CORPORO-VENOUS OCCLUSIVE ERECTILE DYSFUNCTION: ICD-10-CM

## 2023-10-12 DIAGNOSIS — Z94.0 RENAL TRANSPLANT, STATUS POST: ICD-10-CM

## 2023-10-12 DIAGNOSIS — I25.10 CORONARY ARTERY DISEASE INVOLVING NATIVE CORONARY ARTERY OF NATIVE HEART WITHOUT ANGINA PECTORIS: ICD-10-CM

## 2023-10-12 DIAGNOSIS — N40.1 BPH ASSOCIATED WITH NOCTURIA: ICD-10-CM

## 2023-10-12 DIAGNOSIS — R35.1 BPH ASSOCIATED WITH NOCTURIA: ICD-10-CM

## 2023-10-12 DIAGNOSIS — E78.00 HYPERCHOLESTEROLEMIA: ICD-10-CM

## 2023-10-12 DIAGNOSIS — Z00.00 MEDICARE ANNUAL WELLNESS VISIT, SUBSEQUENT: Primary | ICD-10-CM

## 2023-10-12 DIAGNOSIS — K21.9 GASTROESOPHAGEAL REFLUX DISEASE WITHOUT ESOPHAGITIS: ICD-10-CM

## 2023-10-12 DIAGNOSIS — Z95.5 HISTORY OF CORONARY ARTERY STENT PLACEMENT: ICD-10-CM

## 2023-10-12 DIAGNOSIS — E03.4 HYPOTHYROIDISM DUE TO ACQUIRED ATROPHY OF THYROID: ICD-10-CM

## 2023-10-12 RX ORDER — PREDNISONE 5 MG/1
5 TABLET ORAL DAILY
Qty: 90 TABLET | Refills: 1 | Status: SHIPPED | OUTPATIENT
Start: 2023-10-12

## 2023-10-12 RX ORDER — SILDENAFIL 50 MG/1
TABLET, FILM COATED ORAL
Qty: 90 TABLET | Refills: 1 | Status: SHIPPED | OUTPATIENT
Start: 2023-10-12

## 2023-10-12 RX ORDER — TACROLIMUS 0.5 MG/1
0.5 CAPSULE ORAL ONCE
Qty: 1 CAPSULE | Refills: 0 | Status: CANCELLED | OUTPATIENT
Start: 2023-10-12 | End: 2023-10-12

## 2023-10-12 RX ORDER — MAGNESIUM OXIDE 400 MG/1
400 TABLET ORAL DAILY
Qty: 90 TABLET | Refills: 1 | Status: SHIPPED | OUTPATIENT
Start: 2023-10-12

## 2023-10-12 RX ORDER — AMLODIPINE BESYLATE 10 MG/1
10 TABLET ORAL EVERY EVENING
Qty: 90 TABLET | Refills: 1 | Status: SHIPPED | OUTPATIENT
Start: 2023-10-12

## 2023-10-12 RX ORDER — LEVOTHYROXINE SODIUM 0.07 MG/1
TABLET ORAL
Qty: 90 TABLET | Refills: 1 | Status: SHIPPED | OUTPATIENT
Start: 2023-10-12

## 2023-10-12 RX ORDER — AMLODIPINE BESYLATE 10 MG/1
10 TABLET ORAL EVERY EVENING
COMMUNITY
Start: 2021-09-22 | End: 2023-10-12 | Stop reason: SDUPTHER

## 2023-10-12 RX ORDER — CARVEDILOL 25 MG/1
25 TABLET ORAL 2 TIMES DAILY WITH MEALS
Qty: 180 TABLET | Refills: 1 | Status: SHIPPED | OUTPATIENT
Start: 2023-10-12

## 2023-10-12 RX ORDER — FAMOTIDINE 20 MG/1
20 TABLET, FILM COATED ORAL 2 TIMES DAILY
Qty: 180 TABLET | Refills: 1 | Status: SHIPPED | OUTPATIENT
Start: 2023-10-12

## 2023-10-12 RX ORDER — TAMSULOSIN HYDROCHLORIDE 0.4 MG/1
0.4 CAPSULE ORAL DAILY
Qty: 90 CAPSULE | Refills: 1 | Status: SHIPPED | OUTPATIENT
Start: 2023-10-12

## 2023-10-12 RX ORDER — ATORVASTATIN CALCIUM 80 MG/1
80 TABLET, FILM COATED ORAL DAILY
Qty: 90 TABLET | Refills: 1 | Status: SHIPPED | OUTPATIENT
Start: 2023-10-12

## 2023-10-12 RX ORDER — VIT B COMPLX C/FOLIC ACID/ZINC 0.8MG-15MG
1 TABLET ORAL DAILY
COMMUNITY
Start: 2021-03-11

## 2023-10-12 ASSESSMENT — ANXIETY QUESTIONNAIRES
7. FEELING AFRAID AS IF SOMETHING AWFUL MIGHT HAPPEN: 0
2. NOT BEING ABLE TO STOP OR CONTROL WORRYING: 0
IF YOU CHECKED OFF ANY PROBLEMS ON THIS QUESTIONNAIRE, HOW DIFFICULT HAVE THESE PROBLEMS MADE IT FOR YOU TO DO YOUR WORK, TAKE CARE OF THINGS AT HOME, OR GET ALONG WITH OTHER PEOPLE: NOT DIFFICULT AT ALL
GAD7 TOTAL SCORE: 0
1. FEELING NERVOUS, ANXIOUS, OR ON EDGE: 0
5. BEING SO RESTLESS THAT IT IS HARD TO SIT STILL: 0
3. WORRYING TOO MUCH ABOUT DIFFERENT THINGS: 0
4. TROUBLE RELAXING: 0
6. BECOMING EASILY ANNOYED OR IRRITABLE: 0

## 2023-10-12 ASSESSMENT — ENCOUNTER SYMPTOMS
SHORTNESS OF BREATH: 0
ABDOMINAL DISTENTION: 0
BLOOD IN STOOL: 0
DIARRHEA: 0
ANAL BLEEDING: 0
EYE REDNESS: 0
RHINORRHEA: 0
COUGH: 0
NAUSEA: 0
SINUS PRESSURE: 0
CONSTIPATION: 0
VOICE CHANGE: 0
BACK PAIN: 0
WHEEZING: 0
EYE PAIN: 0
VOMITING: 0
SINUS PAIN: 0
CHEST TIGHTNESS: 0
ABDOMINAL PAIN: 0

## 2023-10-12 ASSESSMENT — PATIENT HEALTH QUESTIONNAIRE - PHQ9
2. FEELING DOWN, DEPRESSED OR HOPELESS: 0
10. IF YOU CHECKED OFF ANY PROBLEMS, HOW DIFFICULT HAVE THESE PROBLEMS MADE IT FOR YOU TO DO YOUR WORK, TAKE CARE OF THINGS AT HOME, OR GET ALONG WITH OTHER PEOPLE: 0
7. TROUBLE CONCENTRATING ON THINGS, SUCH AS READING THE NEWSPAPER OR WATCHING TELEVISION: 0
1. LITTLE INTEREST OR PLEASURE IN DOING THINGS: 0
4. FEELING TIRED OR HAVING LITTLE ENERGY: 0
SUM OF ALL RESPONSES TO PHQ QUESTIONS 1-9: 0
SUM OF ALL RESPONSES TO PHQ9 QUESTIONS 1 & 2: 0
SUM OF ALL RESPONSES TO PHQ QUESTIONS 1-9: 0
3. TROUBLE FALLING OR STAYING ASLEEP: 0
9. THOUGHTS THAT YOU WOULD BE BETTER OFF DEAD, OR OF HURTING YOURSELF: 0
SUM OF ALL RESPONSES TO PHQ QUESTIONS 1-9: 0
8. MOVING OR SPEAKING SO SLOWLY THAT OTHER PEOPLE COULD HAVE NOTICED. OR THE OPPOSITE, BEING SO FIGETY OR RESTLESS THAT YOU HAVE BEEN MOVING AROUND A LOT MORE THAN USUAL: 0
6. FEELING BAD ABOUT YOURSELF - OR THAT YOU ARE A FAILURE OR HAVE LET YOURSELF OR YOUR FAMILY DOWN: 0
5. POOR APPETITE OR OVEREATING: 0
SUM OF ALL RESPONSES TO PHQ QUESTIONS 1-9: 0

## 2023-10-12 ASSESSMENT — LIFESTYLE VARIABLES
HOW OFTEN DO YOU HAVE A DRINK CONTAINING ALCOHOL: 2-4 TIMES A MONTH
HOW MANY STANDARD DRINKS CONTAINING ALCOHOL DO YOU HAVE ON A TYPICAL DAY: 3 OR 4

## 2023-10-12 NOTE — PATIENT INSTRUCTIONS
need 8408-9045 mg of calcium and 3029-7445 IU of vitamin D per day. It is possible to meet your calcium requirement with diet alone, but a vitamin D supplement is usually necessary to meet this goal.  When exposed to the sun, use a sunscreen that protects against both UVA and UVB radiation with an SPF of 30 or greater. Reapply every 2 to 3 hours or after sweating, drying off with a towel, or swimming. Always wear a seat belt when traveling in a car. Always wear a helmet when riding a bicycle or motorcycle.

## 2023-10-13 LAB
PROLACTIN SERPL-MCNC: 8.3 NG/ML
TSH SERPL DL<=0.05 MIU/L-ACNC: 2.06 UIU/ML (ref 0.36–3.74)

## 2023-10-14 LAB — TESTOST SERPL-MCNC: 342 NG/DL (ref 264–916)

## 2023-10-17 ENCOUNTER — TELEPHONE (OUTPATIENT)
Age: 78
End: 2023-10-17

## 2023-10-17 NOTE — TELEPHONE ENCOUNTER
Patient had lab work on 10/13 with [facility] and just wanted you to be aware.  The results are in Care everywhere

## 2023-10-19 LAB
TESTOST FREE SERPL-MCNC: 2.2 PG/ML (ref 6.6–18.1)
TESTOST SERPL-MCNC: 342 NG/DL (ref 264–916)

## 2023-10-20 DIAGNOSIS — E29.1 HYPOGONADISM IN MALE: Primary | ICD-10-CM

## 2023-10-24 ENCOUNTER — NURSE ONLY (OUTPATIENT)
Age: 78
End: 2023-10-24

## 2023-10-24 DIAGNOSIS — E29.1 HYPOGONADISM IN MALE: ICD-10-CM

## 2023-10-24 DIAGNOSIS — Z23 NEEDS FLU SHOT: Primary | ICD-10-CM

## 2023-10-24 PROCEDURE — G0008 ADMIN INFLUENZA VIRUS VAC: HCPCS | Performed by: NURSE PRACTITIONER

## 2023-10-24 PROCEDURE — 90694 VACC AIIV4 NO PRSRV 0.5ML IM: CPT | Performed by: NURSE PRACTITIONER

## 2023-10-24 NOTE — PATIENT INSTRUCTIONS

## 2023-10-27 LAB
SHBG SERPL-SCNC: 43 NMOL/L (ref 19.3–76.4)
TESTOST FREE MFR SERPL: 38 NG/DL (ref 40–250)
TESTOST SERPL-MCNC: 236 NG/DL (ref 264–916)
TESTOSTERONE.FREE+WB MFR SERPL: 16.1 % (ref 9–46)

## 2023-11-06 ENCOUNTER — NURSE ONLY (OUTPATIENT)
Age: 78
End: 2023-11-06
Payer: MEDICARE

## 2023-11-06 DIAGNOSIS — E29.1 HYPOGONADISM IN MALE: Primary | ICD-10-CM

## 2023-11-06 PROCEDURE — 36415 COLL VENOUS BLD VENIPUNCTURE: CPT | Performed by: FAMILY MEDICINE

## 2023-11-10 LAB
TESTOST FREE SERPL-MCNC: 2.4 PG/ML (ref 6.6–18.1)
TESTOST SERPL-MCNC: 344 NG/DL (ref 264–916)

## 2023-11-18 DIAGNOSIS — E78.00 HYPERCHOLESTEROLEMIA: ICD-10-CM

## 2023-11-20 NOTE — TELEPHONE ENCOUNTER
Patient requesting refill on     Requested Prescriptions     Pending Prescriptions Disp Refills    atorvastatin (LIPITOR) 80 MG tablet [Pharmacy Med Name: ATORVASTATIN 80MG TABLETS] 90 tablet 1     Sig: TAKE 1 TABLET BY MOUTH DAILY        Last OV 10/12/2023

## 2023-11-21 RX ORDER — ATORVASTATIN CALCIUM 80 MG/1
80 TABLET, FILM COATED ORAL DAILY
Qty: 90 TABLET | Refills: 1 | Status: SHIPPED | OUTPATIENT
Start: 2023-11-21

## 2023-12-23 DIAGNOSIS — E03.4 HYPOTHYROIDISM DUE TO ACQUIRED ATROPHY OF THYROID: ICD-10-CM

## 2023-12-26 RX ORDER — LEVOTHYROXINE SODIUM 0.07 MG/1
TABLET ORAL
Qty: 90 TABLET | Refills: 0 | Status: SHIPPED | OUTPATIENT
Start: 2023-12-26

## 2024-02-22 ENCOUNTER — PATIENT MESSAGE (OUTPATIENT)
Age: 79
End: 2024-02-22

## 2024-05-23 DIAGNOSIS — E78.00 HYPERCHOLESTEROLEMIA: ICD-10-CM

## 2024-05-23 RX ORDER — ATORVASTATIN CALCIUM 80 MG/1
80 TABLET, FILM COATED ORAL DAILY
Qty: 90 TABLET | Refills: 1 | OUTPATIENT
Start: 2024-05-23

## 2024-10-11 DIAGNOSIS — E03.4 HYPOTHYROIDISM DUE TO ACQUIRED ATROPHY OF THYROID: ICD-10-CM

## 2024-10-11 DIAGNOSIS — N52.02 CORPORO-VENOUS OCCLUSIVE ERECTILE DYSFUNCTION: ICD-10-CM

## 2024-10-11 RX ORDER — SILDENAFIL 50 MG/1
TABLET, FILM COATED ORAL
Qty: 90 TABLET | Refills: 1 | Status: SHIPPED | OUTPATIENT
Start: 2024-10-11

## 2024-10-11 RX ORDER — LEVOTHYROXINE SODIUM 75 UG/1
TABLET ORAL
Qty: 90 TABLET | Refills: 0 | Status: SHIPPED | OUTPATIENT
Start: 2024-10-11

## 2024-10-11 NOTE — TELEPHONE ENCOUNTER
Medication Refill Request    Taurus Whitaker III is requesting a refill of the following medication(s):     Sildenafil 50 Mg Tabs    Please send refill to:     [id number] - [place], [place] - [address] [phone] - F [phone]  [address] [address]  Phone: [phone] Fax: [phone]

## 2024-11-22 DIAGNOSIS — E78.00 HYPERCHOLESTEROLEMIA: ICD-10-CM

## 2024-11-22 RX ORDER — ATORVASTATIN CALCIUM 80 MG/1
80 TABLET, FILM COATED ORAL DAILY
Qty: 90 TABLET | Refills: 1 | Status: SHIPPED | OUTPATIENT
Start: 2024-11-22

## 2024-11-22 NOTE — TELEPHONE ENCOUNTER
Medication Refill Request    Taurus Whitaker III is requesting a refill of the following medication(s):     Atorvastatin 80 MG Tab    Please send refill to:     [facility] #[id number] - [place], [place] - [address] [phone] - F [phone]  [address] [address]  Phone: [phone] Fax: [phone]

## 2025-01-05 DIAGNOSIS — E03.4 HYPOTHYROIDISM DUE TO ACQUIRED ATROPHY OF THYROID: ICD-10-CM

## 2025-01-10 RX ORDER — LEVOTHYROXINE SODIUM 75 UG/1
TABLET ORAL
Qty: 90 TABLET | Refills: 0 | Status: SHIPPED | OUTPATIENT
Start: 2025-01-10

## 2025-01-13 ENCOUNTER — PATIENT MESSAGE (OUTPATIENT)
Age: 80
End: 2025-01-13

## 2025-01-13 DIAGNOSIS — E03.4 HYPOTHYROIDISM DUE TO ACQUIRED ATROPHY OF THYROID: ICD-10-CM

## 2025-01-13 RX ORDER — LEVOTHYROXINE SODIUM 75 UG/1
TABLET ORAL
Qty: 90 TABLET | Refills: 0 | Status: SHIPPED | OUTPATIENT
Start: 2025-01-13

## 2025-01-31 ENCOUNTER — OFFICE VISIT (OUTPATIENT)
Age: 80
End: 2025-01-31
Payer: MEDICARE

## 2025-01-31 ENCOUNTER — PATIENT MESSAGE (OUTPATIENT)
Age: 80
End: 2025-01-31

## 2025-01-31 VITALS
BODY MASS INDEX: 24.91 KG/M2 | TEMPERATURE: 97.8 F | RESPIRATION RATE: 16 BRPM | DIASTOLIC BLOOD PRESSURE: 69 MMHG | HEART RATE: 53 BPM | SYSTOLIC BLOOD PRESSURE: 125 MMHG | OXYGEN SATURATION: 93 % | WEIGHT: 168.2 LBS | HEIGHT: 69 IN

## 2025-01-31 DIAGNOSIS — E03.4 HYPOTHYROIDISM DUE TO ACQUIRED ATROPHY OF THYROID: Primary | ICD-10-CM

## 2025-01-31 DIAGNOSIS — I25.10 CORONARY ARTERY DISEASE INVOLVING NATIVE CORONARY ARTERY OF NATIVE HEART WITHOUT ANGINA PECTORIS: ICD-10-CM

## 2025-01-31 DIAGNOSIS — D47.2 MGUS (MONOCLONAL GAMMOPATHY OF UNKNOWN SIGNIFICANCE): ICD-10-CM

## 2025-01-31 DIAGNOSIS — Z00.00 MEDICARE ANNUAL WELLNESS VISIT, SUBSEQUENT: Primary | ICD-10-CM

## 2025-01-31 DIAGNOSIS — R73.03 PREDIABETES: ICD-10-CM

## 2025-01-31 DIAGNOSIS — E03.4 HYPOTHYROIDISM DUE TO ACQUIRED ATROPHY OF THYROID: ICD-10-CM

## 2025-01-31 DIAGNOSIS — Z94.0 RENAL TRANSPLANT, STATUS POST: ICD-10-CM

## 2025-01-31 PROCEDURE — 93005 ELECTROCARDIOGRAM TRACING: CPT | Performed by: STUDENT IN AN ORGANIZED HEALTH CARE EDUCATION/TRAINING PROGRAM

## 2025-01-31 RX ORDER — MAGNESIUM OXIDE TAB 400 MG (241.3 MG ELEMENTAL MG) 400 (241.3 MG) MG
400 TAB ORAL 2 TIMES DAILY
COMMUNITY
Start: 2024-12-03

## 2025-01-31 RX ORDER — NIACINAMIDE 500 MG
500 TABLET ORAL 2 TIMES DAILY WITH MEALS
COMMUNITY

## 2025-01-31 RX ORDER — NITROGLYCERIN 0.4 MG/1
0.4 TABLET SUBLINGUAL EVERY 5 MIN PRN
COMMUNITY

## 2025-01-31 RX ORDER — CHLORAL HYDRATE 500 MG
1000 CAPSULE ORAL DAILY
COMMUNITY

## 2025-01-31 RX ORDER — CARVEDILOL 3.12 MG/1
3.12 TABLET ORAL 2 TIMES DAILY
COMMUNITY
Start: 2024-12-17

## 2025-01-31 ASSESSMENT — PATIENT HEALTH QUESTIONNAIRE - PHQ9
2. FEELING DOWN, DEPRESSED OR HOPELESS: NOT AT ALL
SUM OF ALL RESPONSES TO PHQ QUESTIONS 1-9: 0
SUM OF ALL RESPONSES TO PHQ QUESTIONS 1-9: 0
SUM OF ALL RESPONSES TO PHQ9 QUESTIONS 1 & 2: 0
1. LITTLE INTEREST OR PLEASURE IN DOING THINGS: NOT AT ALL
SUM OF ALL RESPONSES TO PHQ QUESTIONS 1-9: 0
SUM OF ALL RESPONSES TO PHQ QUESTIONS 1-9: 0

## 2025-01-31 ASSESSMENT — LIFESTYLE VARIABLES
HOW MANY STANDARD DRINKS CONTAINING ALCOHOL DO YOU HAVE ON A TYPICAL DAY: 1 OR 2
HOW OFTEN DO YOU HAVE A DRINK CONTAINING ALCOHOL: MONTHLY OR LESS

## 2025-01-31 NOTE — PATIENT INSTRUCTIONS
Please schedule with your cardiologist   If your heart rate is less than 50 at home please let me know and we can discuss stopping the coreg      Hearing Loss: Care Instructions  Overview     Hearing loss is a sudden or slow decrease in how well you hear. It can range from slight to profound. Permanent hearing loss can occur with aging. It also can happen when you are exposed long-term to loud noise. Examples include listening to loud music, riding motorcycles, or being around other loud machines.  Hearing loss can affect your work and home life. It can make you feel lonely or depressed. You may feel that you have lost your independence. But hearing aids and other devices can help you hear better and feel connected to others.  Follow-up care is a key part of your treatment and safety. Be sure to make and go to all appointments, and call your doctor if you are having problems. It's also a good idea to know your test results and keep a list of the medicines you take.  How can you care for yourself at home?  Avoid loud noises whenever possible. This helps keep your hearing from getting worse.  Always wear hearing protection around loud noises.  Wear a hearing aid as directed.  A professional can help you pick a hearing aid that will work best for you.  You can also get hearing aids over the counter for mild to moderate hearing loss.  Have hearing tests as your doctor suggests. They can show whether your hearing has changed. Your hearing aid may need to be adjusted.  Use other devices as needed. These may include:  Telephone amplifiers and hearing aids that can connect to a television, stereo, radio, or microphone.  Devices that use lights or vibrations. These alert you to the doorbell, a ringing telephone, or a baby monitor.  Television closed-captioning. This shows the words at the bottom of the screen. Most new TVs can do this.  TTY (text telephone). This lets you type messages back and forth on the telephone instead

## 2025-01-31 NOTE — PROGRESS NOTES
Chief Complaint   Patient presents with    Medicare AWV       Vitals:    01/31/25 0900   BP: 125/69   Pulse: 53   Resp: 16   Temp: 97.8 °F (36.6 °C)   SpO2: 93%   \"Have you been to the ER, urgent care clinic since your last visit?  Hospitalized since your last visit?\"    NO    “Have you seen or consulted any other health care providers outside our system since your last visit?”    NO

## 2025-01-31 NOTE — PROGRESS NOTES
Medicare Annual Wellness Visit    Taurus Whitaker III is here for Medicare AWV    Assessment & Plan   Medicare annual wellness visit, subsequent  Screenings reviewed. information provided    Prediabetes  Lifestyle changes discussed.   Plan for repeat A1c in 3 months with transplant.    Coronary artery disease involving native coronary artery of native heart without angina pectoris  Establish with cardiology for review to you.  EKG sinus bradycardia today.  He is asymptomatic.  Discussed discontinuing carvedilol however he would like to discuss this with his transplant team.  Can follow-up with cardiology as well warning signs return precautions given.  -     EKG 12 lead; Future    Renal transplant, status post  Following with [facility] nephrology through [facility].  Recent labs reviewed and stable.  Overall doing well.  Follow-up as scheduled.    Hypothyroidism due to acquired atrophy of thyroid  Update TSH.  [facility] message sent to have this drawn with next labs or a return visit.  -     TSH; Future     No follow-ups on file.     Subjective     79 y.o. male who underwent DDKTon 1/23/2022 (Kidney). 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 He has a history of hypertension, coronary artery disease status post PCI with drug-eluting stent.   Followed by transplant nephrology through [facility].  He also sees heme-onc through the [facility] system as well.    Patient's complete Health Risk Assessment and screening values have been reviewed and are found in Flowsheets. The following problems were reviewed today and where indicated follow up appointments were made and/or referrals ordered.     MGUS:   Under active surveillance with heme-onc (Betzaida/Cristina) at [facility].  He has an upcoming visit scheduled.  Overall has been stable    Prediabetes:   A1c 6 on 1/9/25  Since that visit he has cut back on sweets (candy + baked goods).   He has started walking.  He is helping control this without medications.

## 2025-02-04 ENCOUNTER — TELEPHONE (OUTPATIENT)
Age: 80
End: 2025-02-04

## 2025-02-04 NOTE — TELEPHONE ENCOUNTER
Trying to get in touch with patient.  [facility] states he just had labs done on the 9th.  The labs he has in chart is for future.  She is not sure he is coming in anytime soon.  Attempted to call patient to see if it was possible for him to come in here for labs.

## 2025-04-05 DIAGNOSIS — E03.4 HYPOTHYROIDISM DUE TO ACQUIRED ATROPHY OF THYROID: ICD-10-CM

## 2025-04-07 RX ORDER — LEVOTHYROXINE SODIUM 75 UG/1
TABLET ORAL
Qty: 90 TABLET | Refills: 0 | Status: SHIPPED | OUTPATIENT
Start: 2025-04-07

## 2025-04-07 NOTE — TELEPHONE ENCOUNTER
Patient requesting refill on     Requested Prescriptions     Pending Prescriptions Disp Refills    levothyroxine (SYNTHROID) 75 MCG tablet [Pharmacy Med Name: LEVOTHYROXINE 0.075MG (75MCG) TABS] 90 tablet 0     Sig: TAKE 1 TABLET BY MOUTH EVERY DAY ON AN EMPTY STOMACH        Last OV 1/31/2025

## 2025-06-26 DIAGNOSIS — E03.4 HYPOTHYROIDISM DUE TO ACQUIRED ATROPHY OF THYROID: ICD-10-CM

## 2025-06-26 RX ORDER — LEVOTHYROXINE SODIUM 75 UG/1
TABLET ORAL
Qty: 90 TABLET | Refills: 0 | Status: SHIPPED | OUTPATIENT
Start: 2025-06-26